# Patient Record
Sex: MALE | Race: WHITE | NOT HISPANIC OR LATINO | Employment: FULL TIME | URBAN - METROPOLITAN AREA
[De-identification: names, ages, dates, MRNs, and addresses within clinical notes are randomized per-mention and may not be internally consistent; named-entity substitution may affect disease eponyms.]

---

## 2018-01-04 ENCOUNTER — PATIENT MESSAGE (OUTPATIENT)
Dept: INTERNAL MEDICINE | Facility: CLINIC | Age: 38
End: 2018-01-04

## 2018-01-04 DIAGNOSIS — Z00.00 ENCOUNTER FOR ANNUAL PHYSICAL EXAM: Primary | ICD-10-CM

## 2018-01-17 ENCOUNTER — LAB VISIT (OUTPATIENT)
Dept: LAB | Facility: HOSPITAL | Age: 38
End: 2018-01-17
Attending: INTERNAL MEDICINE
Payer: COMMERCIAL

## 2018-01-17 DIAGNOSIS — Z00.00 ENCOUNTER FOR ANNUAL PHYSICAL EXAM: ICD-10-CM

## 2018-01-17 LAB
25(OH)D3+25(OH)D2 SERPL-MCNC: 17 NG/ML
ALBUMIN SERPL BCP-MCNC: 4.1 G/DL
ALP SERPL-CCNC: 97 U/L
ALT SERPL W/O P-5'-P-CCNC: 23 U/L
ANION GAP SERPL CALC-SCNC: 8 MMOL/L
AST SERPL-CCNC: 24 U/L
BASOPHILS # BLD AUTO: 0.06 K/UL
BASOPHILS NFR BLD: 0.6 %
BILIRUB SERPL-MCNC: 0.5 MG/DL
BUN SERPL-MCNC: 15 MG/DL
CALCIUM SERPL-MCNC: 9.6 MG/DL
CHLORIDE SERPL-SCNC: 103 MMOL/L
CHOLEST SERPL-MCNC: 227 MG/DL
CHOLEST/HDLC SERPL: 4.6 {RATIO}
CO2 SERPL-SCNC: 27 MMOL/L
CREAT SERPL-MCNC: 1 MG/DL
DIFFERENTIAL METHOD: ABNORMAL
EOSINOPHIL # BLD AUTO: 0.4 K/UL
EOSINOPHIL NFR BLD: 3.4 %
ERYTHROCYTE [DISTWIDTH] IN BLOOD BY AUTOMATED COUNT: 13.2 %
EST. GFR  (AFRICAN AMERICAN): >60 ML/MIN/1.73 M^2
EST. GFR  (NON AFRICAN AMERICAN): >60 ML/MIN/1.73 M^2
GLUCOSE SERPL-MCNC: 84 MG/DL
HCT VFR BLD AUTO: 44.1 %
HDLC SERPL-MCNC: 49 MG/DL
HDLC SERPL: 21.6 %
HGB BLD-MCNC: 15.1 G/DL
LDLC SERPL CALC-MCNC: 152.2 MG/DL
LYMPHOCYTES # BLD AUTO: 3.2 K/UL
LYMPHOCYTES NFR BLD: 30.6 %
MCH RBC QN AUTO: 28.9 PG
MCHC RBC AUTO-ENTMCNC: 34.2 G/DL
MCV RBC AUTO: 85 FL
MONOCYTES # BLD AUTO: 0.9 K/UL
MONOCYTES NFR BLD: 8.2 %
NEUTROPHILS # BLD AUTO: 6 K/UL
NEUTROPHILS NFR BLD: 57.2 %
NONHDLC SERPL-MCNC: 178 MG/DL
PLATELET # BLD AUTO: 353 K/UL
PMV BLD AUTO: 9.7 FL
POTASSIUM SERPL-SCNC: 4.1 MMOL/L
PROT SERPL-MCNC: 7.8 G/DL
RBC # BLD AUTO: 5.22 M/UL
SODIUM SERPL-SCNC: 138 MMOL/L
TRIGL SERPL-MCNC: 129 MG/DL
WBC # BLD AUTO: 10.55 K/UL

## 2018-01-17 PROCEDURE — 85025 COMPLETE CBC W/AUTO DIFF WBC: CPT

## 2018-01-17 PROCEDURE — 80061 LIPID PANEL: CPT

## 2018-01-17 PROCEDURE — 36415 COLL VENOUS BLD VENIPUNCTURE: CPT

## 2018-01-17 PROCEDURE — 82306 VITAMIN D 25 HYDROXY: CPT

## 2018-01-17 PROCEDURE — 80053 COMPREHEN METABOLIC PANEL: CPT

## 2018-01-19 ENCOUNTER — OFFICE VISIT (OUTPATIENT)
Dept: INTERNAL MEDICINE | Facility: CLINIC | Age: 38
End: 2018-01-19
Payer: COMMERCIAL

## 2018-01-19 ENCOUNTER — IMMUNIZATION (OUTPATIENT)
Dept: INTERNAL MEDICINE | Facility: CLINIC | Age: 38
End: 2018-01-19
Payer: COMMERCIAL

## 2018-01-19 VITALS
BODY MASS INDEX: 33.95 KG/M2 | DIASTOLIC BLOOD PRESSURE: 75 MMHG | WEIGHT: 242.5 LBS | HEIGHT: 71 IN | SYSTOLIC BLOOD PRESSURE: 125 MMHG

## 2018-01-19 DIAGNOSIS — E78.2 MIXED HYPERLIPIDEMIA: ICD-10-CM

## 2018-01-19 DIAGNOSIS — J01.00 SUBACUTE MAXILLARY SINUSITIS: ICD-10-CM

## 2018-01-19 DIAGNOSIS — E55.9 MILD VITAMIN D DEFICIENCY: ICD-10-CM

## 2018-01-19 DIAGNOSIS — E66.09 NON MORBID OBESITY DUE TO EXCESS CALORIES: ICD-10-CM

## 2018-01-19 DIAGNOSIS — Z00.00 ANNUAL PHYSICAL EXAM: Primary | ICD-10-CM

## 2018-01-19 PROCEDURE — 99999 PR PBB SHADOW E&M-EST. PATIENT-LVL III: CPT | Mod: PBBFAC,,, | Performed by: INTERNAL MEDICINE

## 2018-01-19 PROCEDURE — 99395 PREV VISIT EST AGE 18-39: CPT | Mod: 25,S$GLB,, | Performed by: INTERNAL MEDICINE

## 2018-01-19 PROCEDURE — 90471 IMMUNIZATION ADMIN: CPT | Mod: S$GLB,,, | Performed by: INTERNAL MEDICINE

## 2018-01-19 PROCEDURE — 90686 IIV4 VACC NO PRSV 0.5 ML IM: CPT | Mod: S$GLB,,, | Performed by: INTERNAL MEDICINE

## 2018-01-19 RX ORDER — AMOXICILLIN AND CLAVULANATE POTASSIUM 875; 125 MG/1; MG/1
1 TABLET, FILM COATED ORAL 2 TIMES DAILY
Qty: 14 TABLET | Refills: 0 | Status: SHIPPED | OUTPATIENT
Start: 2018-01-19 | End: 2018-01-26

## 2018-01-19 RX ORDER — VIT C/E/ZN/COPPR/LUTEIN/ZEAXAN 250MG-90MG
2000 CAPSULE ORAL DAILY
Qty: 60 CAPSULE | Refills: 12 | Status: SHIPPED | OUTPATIENT
Start: 2018-01-19 | End: 2019-01-14

## 2018-01-19 RX ORDER — ERGOCALCIFEROL 1.25 MG/1
50000 CAPSULE ORAL
Qty: 12 CAPSULE | Refills: 12 | Status: SHIPPED | OUTPATIENT
Start: 2018-01-19 | End: 2018-01-19 | Stop reason: SDUPTHER

## 2018-01-19 RX ORDER — ERGOCALCIFEROL 1.25 MG/1
50000 CAPSULE ORAL
Qty: 12 CAPSULE | Refills: 12 | Status: SHIPPED | OUTPATIENT
Start: 2018-01-19 | End: 2019-01-14

## 2018-01-19 NOTE — PATIENT INSTRUCTIONS
Prevention Guidelines, Men Ages 40 to 49  Screening tests and vaccines are an important part of managing your health. Health counseling is essential, too. Below are guidelines for these, for men ages 40 to 49. Talk with your healthcare provider to make sure youre up to date on what you need.  Screening Who needs it How often   Alcohol misuse All men in this age group At routine exams   Blood pressure All men in this age group Every 2 years if your blood pressure reading is less than 120/80 mm Hg; yearly if your systolic blood pressure reading is 120 to 139 mm Hg, or your diastolic blood pressure reading is 80 to 89 mm Hg   Depression All men in this age group At routine exams   Type 2 diabetes or prediabetes All adults beginning at age 45 and adults without symptoms at any age who are overweight or obese and have 1 or more other risk factors for diabetes At least every 3 years (yearly if blood sugar has begun to rise)   Hepatitis C Men at increased risk for infection - talk with your healthcare provider At routine exams   High cholesterol or triglycerides All men ages 35 and older, and younger men at high risk for coronary artery disease At least every 5 years   HIV All men At routine exams   Obesity All men in this age group At routine exams   Prostate cancer Starting at age 45, talk to healthcare provider about risks and benefits of digital rectal exam (RANDELL) and prostate-specific antigen (PSA) screening1 At routine exams   Syphilis Men at increased risk for infection - talk with your healthcare provider At routine exams   Tuberculosis Men at increased risk for infection - talk with your healthcare provider Check with your healthcare provider   Vision All men in this age group Every 2 to 4 years if no risk factors for eye disease2   Vaccine Who needs it How often   Chickenpox (varicella) All men in this age group who have no record of this infection or vaccine 2 doses; the second dose should be given at least 4  weeks after the first dose   Hepatitis A Men at increased risk for infection - talk with your healthcare provider 2 doses given at least 6 months apart   Hepatitis B Men at increased risk for infection - talk with your healthcare provider 3 doses over 6 months; second dose should be given 1 month after the first dose; the third dose should be given at least 2 months after the second dose and at least 4 months after the first dose   Haemophilus influenzae Type B (HIB) Men at increased risk for infection - talk with your healthcare provider 1 to 3 doses   Influenza (flu) All men in this age group Once a year   Measles, mumps, rubella (MMR) All men in this age group who have no record of these infections or vaccines 1 or 2 doses   Meningococcal Men at increased risk for infection - talk with your healthcare provider 1 or more doses   Pneumococcal conjugate vaccine (PCV13) and pneumococcal polysaccharide vaccine (PPSV23) Men at increased risk for infection - talk with your healthcare provider PCV13: 1 dose ages 19 to 65 (protects against 13 types of pneumococcal bacteria)     PPSV23: 1 to 2 doses through age 64, or 1 dose at 65 or older (protects against 23 types of pneumococcal bacteria)      Tetanus/diphtheria/  pertussis (Td/Tdap) booster All men in this age group Td every 10 years, or a one-time dose of Tdap instead of a Td booster after age 18, then Td every 10 years   Counseling Who needs it How often   Diet and exercise Men who are overweight or obese When diagnosed, and then at routine exams   Sexually transmitted infection prevention Men at increased risk for infection - talk with your healthcare provider At routine exams   Use of daily aspirin Men ages 45 to 79 at risk for cardiovascular health problems At routine exams   Use of tobacco and the health effects it can cause All men in this age group Every exam   [address] [place] [address] [facility]   [facility]  Date Last Reviewed:  2/1/2017 © 2000-2017 [employer]. [address] [place] [address], [place] [address]. All rights reserved. This information is not intended as a substitute for professional medical care. Always follow your healthcare professional's instructions.        Sinus Headache    The sinuses are air-filled spaces within the bones of the face. They connect to the inside of the nose. Sinusitis is an inflammation of the tissue lining the sinus cavity. Sinus inflammation can occur during a cold or hay fever (allergies to pollens and other particles in the air) and cause symptoms of sinus congestion and fullness and perhaps a low-grade fever. An infection is usually present when there is also facial pain or headache and green or yellow drainage from the nose or into the back of the throat (postnasal drip). Antibiotics are often prescribed to treat this condition.  Sinus headache may cause pain in different places, depending on which sinuses are infected. There may be pain in the temples, forehead, top of the head, behind or around the eye, across the cheekbone, or into the upper teeth.  You may find that changing your position, sitting upright or lying down, will bring some relief.  Home care  The following guidelines will help you care for yourself at home:  · Drink plenty of water, hot tea, and other liquids to stay well hydrated. This thins the mucus and helps your sinuses drain.  · Apply heat to the painful areas of the face. Use a towel soaked in hot water. Or  the shower with the hot spray on your face. This is a good way to inhale warm water vapor and get heat on your face at the same time. Cover your mouth and nose with your hands so you can still breathe as you do this.  · Use a cool mist vaporizer at night. Suck on peppermint, menthol, or eucalyptus hard candies during the day.  · An expectorant containing guaifenesin helps thin the mucus. It also helps your sinuses drain.  · You may use over-the-counter  decongestants unless a similar medicine was prescribed. Nasal sprays or drops work the fastest. Use one that contains phenylephrine or oxymetazoline. First blow your nose gently to remove mucus. Then apply the spray or drops. Don't use decongestant nasal sprays or drops more often than the label says or for more than 3 days. This can make symptoms worse. Nasal sprays or drops prescribed by your doctor typically do not have these limits. Check with your doctor or pharmacist. You may also use oral tablets containing pseudoephedrine. Side effects from oral decongestants tend to be worse than with nasal sprays or drops, and may keep you from using them. Many sinus remedies combine ingredients, which may increase side effects. Also, if you are taking a combination medicine with another medicine, be sure you are not taking a double dose of anything by mistake. Read the labels or ask the pharmacist for help. Talk with your doctor before using decongestants if you have high blood pressure, heart disease, glaucoma, or prostate trouble.  · Antihistamines may help if allergies are causing your sinusitis. You can get chlorpheniramine and diphenhydramine over the counter, but these can cause drowsiness. Don't use these if you have glaucoma or if you are a man with trouble urinating due to an enlarged prostate. Over-the-counter antihistamines containing loratidine and cetirizine cause less drowsiness and may be a better choice for daytime use.  · When allergies cause your sinusitis, a saline nasal rinse may give relief. A saline nasal rinse reduces swelling and clears excess mucus. This allows sinuses to drain. Prepackaged kits are available at most drugstores. These contain premixed salt packets and an irrigation device. If antibiotics have been prescribed to treat an acute sinus infection, talk with your doctor before using a nasal rinse to be sure it is safe for you.  · You may use over-the-counter medicine to control pain and  fever, unless another pain medicine was prescribed. Talk with your doctor before using acetaminophen or ibuprofen if you have chronic liver or kidney disease. Also talk with your doctor if you have ever had a stomach ulcer. Aspirin should never be used in anyone under 18 years of age who has a fever. It may cause a life-threatening condition called Reye syndrome.  · If antibiotics were given, finish all of them, even if you are feeling better after a few days.  Follow-up care  Follow up with your healthcare provider, or as advised if your symptoms aren't better in 1 week.  Call 911  Call 911 if any of these occur:  · Unusual drowsiness or confusion  · Swelling of the forehead or eyelids  · Vision problems including blurred or double vision  · Seizure  When to seek medical advice  Call your healthcare provider right away if any of these occur:  · Facial pain or headache becomes more severe  · Stiff neck  · Fever over 100.4º F (38.0º C) for more than 3 days on antibiotics  · Bleeding from the nose or throat  Date Last Reviewed: 10/1/2016  © 2693-4989 [employer]. [address] [place] [address], [place] [address]. All rights reserved. This information is not intended as a substitute for professional medical care. Always follow your healthcare professional's instructions.

## 2019-01-07 ENCOUNTER — PATIENT MESSAGE (OUTPATIENT)
Dept: INTERNAL MEDICINE | Facility: CLINIC | Age: 39
End: 2019-01-07

## 2019-01-07 DIAGNOSIS — E78.2 MIXED HYPERLIPIDEMIA: Primary | ICD-10-CM

## 2019-01-07 DIAGNOSIS — E66.09 NON MORBID OBESITY DUE TO EXCESS CALORIES: ICD-10-CM

## 2019-01-07 DIAGNOSIS — E55.9 MILD VITAMIN D DEFICIENCY: ICD-10-CM

## 2019-01-10 ENCOUNTER — LAB VISIT (OUTPATIENT)
Dept: LAB | Facility: HOSPITAL | Age: 39
End: 2019-01-10
Attending: INTERNAL MEDICINE
Payer: COMMERCIAL

## 2019-01-10 DIAGNOSIS — E66.09 NON MORBID OBESITY DUE TO EXCESS CALORIES: ICD-10-CM

## 2019-01-10 DIAGNOSIS — E78.2 MIXED HYPERLIPIDEMIA: ICD-10-CM

## 2019-01-10 LAB
ALBUMIN SERPL BCP-MCNC: 4.1 G/DL
ALP SERPL-CCNC: 90 U/L
ALT SERPL W/O P-5'-P-CCNC: 28 U/L
ANION GAP SERPL CALC-SCNC: 9 MMOL/L
AST SERPL-CCNC: 29 U/L
BASOPHILS # BLD AUTO: 0.05 K/UL
BASOPHILS NFR BLD: 0.5 %
BILIRUB SERPL-MCNC: 0.4 MG/DL
BUN SERPL-MCNC: 18 MG/DL
CALCIUM SERPL-MCNC: 9.6 MG/DL
CHLORIDE SERPL-SCNC: 105 MMOL/L
CHOLEST SERPL-MCNC: 255 MG/DL
CHOLEST/HDLC SERPL: 4.8 {RATIO}
CO2 SERPL-SCNC: 26 MMOL/L
CREAT SERPL-MCNC: 1 MG/DL
DIFFERENTIAL METHOD: NORMAL
EOSINOPHIL # BLD AUTO: 0.2 K/UL
EOSINOPHIL NFR BLD: 1.9 %
ERYTHROCYTE [DISTWIDTH] IN BLOOD BY AUTOMATED COUNT: 13.6 %
EST. GFR  (AFRICAN AMERICAN): >60 ML/MIN/1.73 M^2
EST. GFR  (NON AFRICAN AMERICAN): >60 ML/MIN/1.73 M^2
GLUCOSE SERPL-MCNC: 82 MG/DL
HCT VFR BLD AUTO: 42.2 %
HDLC SERPL-MCNC: 53 MG/DL
HDLC SERPL: 20.8 %
HGB BLD-MCNC: 14.1 G/DL
LDLC SERPL CALC-MCNC: 176.8 MG/DL
LYMPHOCYTES # BLD AUTO: 3.1 K/UL
LYMPHOCYTES NFR BLD: 29.4 %
MCH RBC QN AUTO: 28.7 PG
MCHC RBC AUTO-ENTMCNC: 33.4 G/DL
MCV RBC AUTO: 86 FL
MONOCYTES # BLD AUTO: 1 K/UL
MONOCYTES NFR BLD: 9.6 %
NEUTROPHILS # BLD AUTO: 6.3 K/UL
NEUTROPHILS NFR BLD: 58.6 %
NONHDLC SERPL-MCNC: 202 MG/DL
PLATELET # BLD AUTO: 321 K/UL
PMV BLD AUTO: 9.3 FL
POTASSIUM SERPL-SCNC: 4.1 MMOL/L
PROT SERPL-MCNC: 7.9 G/DL
RBC # BLD AUTO: 4.92 M/UL
SODIUM SERPL-SCNC: 140 MMOL/L
TRIGL SERPL-MCNC: 126 MG/DL
WBC # BLD AUTO: 10.65 K/UL

## 2019-01-10 PROCEDURE — 36415 COLL VENOUS BLD VENIPUNCTURE: CPT

## 2019-01-10 PROCEDURE — 80053 COMPREHEN METABOLIC PANEL: CPT

## 2019-01-10 PROCEDURE — 85025 COMPLETE CBC W/AUTO DIFF WBC: CPT

## 2019-01-10 PROCEDURE — 80061 LIPID PANEL: CPT

## 2019-01-10 PROCEDURE — 82306 VITAMIN D 25 HYDROXY: CPT

## 2019-01-11 LAB — 25(OH)D3+25(OH)D2 SERPL-MCNC: 28 NG/ML

## 2019-01-14 ENCOUNTER — OFFICE VISIT (OUTPATIENT)
Dept: INTERNAL MEDICINE | Facility: CLINIC | Age: 39
End: 2019-01-14
Payer: COMMERCIAL

## 2019-01-14 ENCOUNTER — IMMUNIZATION (OUTPATIENT)
Dept: INTERNAL MEDICINE | Facility: CLINIC | Age: 39
End: 2019-01-14
Payer: COMMERCIAL

## 2019-01-14 VITALS
BODY MASS INDEX: 35.18 KG/M2 | HEIGHT: 71 IN | SYSTOLIC BLOOD PRESSURE: 125 MMHG | WEIGHT: 251.31 LBS | DIASTOLIC BLOOD PRESSURE: 80 MMHG

## 2019-01-14 DIAGNOSIS — Z00.00 ANNUAL PHYSICAL EXAM: Primary | ICD-10-CM

## 2019-01-14 DIAGNOSIS — E78.2 MIXED HYPERLIPIDEMIA: ICD-10-CM

## 2019-01-14 DIAGNOSIS — E55.9 MILD VITAMIN D DEFICIENCY: ICD-10-CM

## 2019-01-14 DIAGNOSIS — E66.01 SEVERE OBESITY (BMI 35.0-35.9 WITH COMORBIDITY): ICD-10-CM

## 2019-01-14 PROCEDURE — 99999 PR PBB SHADOW E&M-EST. PATIENT-LVL III: ICD-10-PCS | Mod: PBBFAC,,, | Performed by: INTERNAL MEDICINE

## 2019-01-14 PROCEDURE — 99395 PREV VISIT EST AGE 18-39: CPT | Mod: 25,S$GLB,, | Performed by: INTERNAL MEDICINE

## 2019-01-14 PROCEDURE — 99395 PR PREVENTIVE VISIT,EST,18-39: ICD-10-PCS | Mod: 25,S$GLB,, | Performed by: INTERNAL MEDICINE

## 2019-01-14 PROCEDURE — 99999 PR PBB SHADOW E&M-EST. PATIENT-LVL III: CPT | Mod: PBBFAC,,, | Performed by: INTERNAL MEDICINE

## 2019-01-14 PROCEDURE — 90686 IIV4 VACC NO PRSV 0.5 ML IM: CPT | Mod: S$GLB,,, | Performed by: INTERNAL MEDICINE

## 2019-01-14 PROCEDURE — 90686 FLU VACCINE (QUAD) GREATER THAN OR EQUAL TO 3YO PRESERVATIVE FREE IM: ICD-10-PCS | Mod: S$GLB,,, | Performed by: INTERNAL MEDICINE

## 2019-01-14 PROCEDURE — 90471 IMMUNIZATION ADMIN: CPT | Mod: S$GLB,,, | Performed by: INTERNAL MEDICINE

## 2019-01-14 PROCEDURE — 90471 FLU VACCINE (QUAD) GREATER THAN OR EQUAL TO 3YO PRESERVATIVE FREE IM: ICD-10-PCS | Mod: S$GLB,,, | Performed by: INTERNAL MEDICINE

## 2019-01-14 RX ORDER — VIT C/E/ZN/COPPR/LUTEIN/ZEAXAN 250MG-90MG
1000 CAPSULE ORAL DAILY
Qty: 30 CAPSULE | Refills: 12 | Status: SHIPPED | OUTPATIENT
Start: 2019-01-14

## 2019-01-14 NOTE — PROGRESS NOTES
Subjective:       Patient ID: Junior Silva is a 38 y.o. male.    Chief Complaint: Annual Exam    Annual exam.    , about to have a baby.    BMI 35 +.    Lipids again discussed.    Still not exercising.  Denies ALEXANDER symptoms.  Denies depression.  Long discussion, essentially identical to previous discussions over the course of the last many years regarding diet, lifestyle, exercise, sleep hygiene and medication recommendations.    Patient Active Problem List:     Mixed hyperlipidemia     Mild vitamin D deficiency     Gastroesophageal reflux disease without esophagitis     Hemorrhoids     Severe obesity with comorbidity        Review of Systems   Constitutional: Negative.    HENT: Negative for congestion, postnasal drip, rhinorrhea and sinus pressure.    Eyes: Negative for redness and visual disturbance.   Respiratory: Negative for apnea, choking, shortness of breath and stridor.    Cardiovascular: Negative for chest pain, palpitations and leg swelling.   Gastrointestinal: Negative for abdominal pain, constipation, diarrhea and nausea.   Genitourinary: Negative for difficulty urinating, flank pain, frequency, testicular pain and urgency.   Musculoskeletal: Negative for arthralgias, back pain and myalgias.   Skin: Negative for color change and rash.   Neurological: Negative.    Psychiatric/Behavioral: Negative.        Objective:      Physical Exam   Constitutional: He is oriented to person, place, and time. He appears well-developed and well-nourished.   HENT:   Head: Normocephalic and atraumatic.   Right Ear: External ear normal.   Left Ear: External ear normal.   Eyes: Conjunctivae and EOM are normal. Pupils are equal, round, and reactive to light.   Neck: Normal range of motion. Neck supple. No thyromegaly present.   Cardiovascular: Normal rate and regular rhythm.   No murmur heard.  Pulmonary/Chest: Effort normal and breath sounds normal. No respiratory distress. He has no wheezes.   Abdominal: Soft. He  exhibits no distension. There is no tenderness.   Musculoskeletal: He exhibits no edema or tenderness.   Lymphadenopathy:     He has no cervical adenopathy.   Neurological: He is alert and oriented to person, place, and time. No cranial nerve deficit.   Skin: Skin is warm and dry.   Psychiatric: He has a normal mood and affect. His behavior is normal.       Assessment:       1. Annual physical exam    2. Mixed hyperlipidemia    3. Mild vitamin D deficiency    4. Severe obesity (BMI 35.0-35.9 with comorbidity)        Plan:         Junior SURESH was seen today for annual exam.    Diagnoses and all orders for this visit:    Annual physical exam    Mixed hyperlipidemia:  Exercise, lifestyle modification, portion control, diet issues reviewed.  Goal of 20-25 lb weight loss in the next year.  He declines dietitian appointment or bariatric assessment.    Mild vitamin D deficiency  -     cholecalciferol, vitamin D3, (VITAMIN D3) 1,000 unit capsule; Take 1 capsule (1,000 Units total) by mouth once daily.    Severe obesity (BMI 35.0-35.9 with comorbidity):  See above.  Denies any symptoms of ALEXANDER.  He feels he can do this on his own.  Long discussion.    Anticipate annual follow-up but return sooner with problems in the interim  Flu shot today

## 2019-01-14 NOTE — PATIENT INSTRUCTIONS
Prevention Guidelines, Men Ages 18 to 39  Screening tests and vaccines are an important part of managing your health. Health counseling is essential, too. Below are guidelines for these, for men ages 18 to 39. Talk with your healthcare provider to make sure youre up-to-date on what you need.  Screening Who needs it How often   Alcohol misuse All men in this age group At routine exams   Blood pressure All men in this age group Every 2 years if your blood pressure is less than 120/80 mm Hg; yearly if your systolic blood pressure is 120 to 139 mm Hg, or your diastolic blood pressure reading is 80 to 89 mm Hg   Depression All men in this age group At routine exams   Diabetes mellitus, type 2 Adults who have no symptoms but are overweight or obese and have 1 or more other risk factors for diabetes At least every 3 years (yearly if blood sugar has already started to rise)   Hepatitis C If at increased risk At routine exams   High cholesterol or triglycerides All men ages 35 and older, and younger men at high risk for coronary artery disease At least every 5 years   HIV All men At routine exams   Obesity All men in this age group At routine exams   Syphilis Men at increased risk for infection - talk with your healthcare provider At routine exams   Tuberculosis Men at increased risk for infection - talk with your healthcare provider Check with your healthcare provider   Vision All men in this age group Every 5 to 10 years if no risk factors for eye disease   Vaccines Who needs it How often   Chickenpox (varicella) All men in this age group who have no record of this infection or vaccine 2 doses; the second dose should be given at least 4 weeks after the first dose   Hepatitis A Men at increased risk for infection - talk with your healthcare provider 2 doses given at least 6 months apart   Hepatitis B Men at increased risk for infection - talk with your healthcare provider 3 doses over 6 months; second dose should be  given 1 month after the first dose; the third dose should be given at least 2 months after the second dose and at least 4 months after the first dose   Haemophilus influenzae Type B (HIB) Men at increased risk for infection - talk with your healthcare provider 1 to 3 doses   Human papillomavirus (HPV) All men in this age group up to age 26 3 doses; the second dose should be given 1 to 2 months after the first dose and the third dose given 6 months after the first dose   Influenza (flu) All men in this age group Once a year   Measles, mumps, rubella (MMR) All men in this age group who have no record of these infections or vaccines 1 or 2 doses through age 55   Meningococcal Men at increased risk for infection - talk with your healthcare provider 1 or more doses   Pneumococca (PCV13) and Pneumococcal (PPSV23) Men at increased risk for infection - talk with your healthcare provider PCV13: 1 dose ages 19 to 65 (protects against 13 types of pneumococcal bacteria)  PPSV23: 1 to 2 doses through age 64, or 1 dose at 65 or older (protects against 23 types of pneumococcal bacteria)   Tetanus/diphtheria/pertussis (Td/Tdap) booster All men in this age group A one-time Tdap booster after age 18, then Td every10 years   Counseling Who needs it How often   Diet and exercise Overweight or obese people When diagnosed, and then at routine exams   Use of tobacco and the health effects it can cause All men in this age group Every visit   Sexually transmitted infection prevention Men who are sexually active At routine exams   Skin cancer Prevention of skin cancer in fair-skinned adults through age 24 At routine exams   1Those who are 18 years of age, who are not up-to-date on their childhood immunizations, should receive all appropriate catch-up vaccines recommended by the CDC.  Date Last Reviewed: 2/1/2017  © 0267-4673 [employer]. [address] [place] [address], [place] [address]. All rights reserved. This information is not  intended as a substitute for professional medical care. Always follow your healthcare professional's instructions.

## 2020-10-05 ENCOUNTER — PATIENT MESSAGE (OUTPATIENT)
Dept: ADMINISTRATIVE | Facility: HOSPITAL | Age: 40
End: 2020-10-05

## 2021-01-04 ENCOUNTER — PATIENT MESSAGE (OUTPATIENT)
Dept: ADMINISTRATIVE | Facility: HOSPITAL | Age: 41
End: 2021-01-04

## 2021-04-05 ENCOUNTER — PATIENT MESSAGE (OUTPATIENT)
Dept: ADMINISTRATIVE | Facility: HOSPITAL | Age: 41
End: 2021-04-05

## 2021-08-16 ENCOUNTER — OFFICE VISIT (OUTPATIENT)
Dept: INTERNAL MEDICINE | Facility: CLINIC | Age: 41
End: 2021-08-16
Payer: COMMERCIAL

## 2021-08-16 VITALS
WEIGHT: 269 LBS | DIASTOLIC BLOOD PRESSURE: 80 MMHG | BODY MASS INDEX: 37.66 KG/M2 | HEIGHT: 71 IN | SYSTOLIC BLOOD PRESSURE: 135 MMHG

## 2021-08-16 DIAGNOSIS — B96.89 ACUTE BACTERIAL SINUSITIS: ICD-10-CM

## 2021-08-16 DIAGNOSIS — E78.2 MIXED HYPERLIPIDEMIA: ICD-10-CM

## 2021-08-16 DIAGNOSIS — E55.9 MILD VITAMIN D DEFICIENCY: ICD-10-CM

## 2021-08-16 DIAGNOSIS — Z00.00 ANNUAL PHYSICAL EXAM: Primary | ICD-10-CM

## 2021-08-16 DIAGNOSIS — K64.9 HEMORRHOIDS, UNSPECIFIED HEMORRHOID TYPE: ICD-10-CM

## 2021-08-16 DIAGNOSIS — J01.90 ACUTE BACTERIAL SINUSITIS: ICD-10-CM

## 2021-08-16 DIAGNOSIS — E66.01 SEVERE OBESITY (BMI 35.0-35.9 WITH COMORBIDITY): ICD-10-CM

## 2021-08-16 DIAGNOSIS — K92.1 BLOOD IN STOOL: ICD-10-CM

## 2021-08-16 PROCEDURE — 99999 PR PBB SHADOW E&M-EST. PATIENT-LVL III: ICD-10-PCS | Mod: PBBFAC,,, | Performed by: INTERNAL MEDICINE

## 2021-08-16 PROCEDURE — 3079F PR MOST RECENT DIASTOLIC BLOOD PRESSURE 80-89 MM HG: ICD-10-PCS | Mod: CPTII,S$GLB,, | Performed by: INTERNAL MEDICINE

## 2021-08-16 PROCEDURE — 1160F RVW MEDS BY RX/DR IN RCRD: CPT | Mod: CPTII,S$GLB,, | Performed by: INTERNAL MEDICINE

## 2021-08-16 PROCEDURE — 1159F PR MEDICATION LIST DOCUMENTED IN MEDICAL RECORD: ICD-10-PCS | Mod: CPTII,S$GLB,, | Performed by: INTERNAL MEDICINE

## 2021-08-16 PROCEDURE — 3075F SYST BP GE 130 - 139MM HG: CPT | Mod: CPTII,S$GLB,, | Performed by: INTERNAL MEDICINE

## 2021-08-16 PROCEDURE — 1126F PR PAIN SEVERITY QUANTIFIED, NO PAIN PRESENT: ICD-10-PCS | Mod: CPTII,S$GLB,, | Performed by: INTERNAL MEDICINE

## 2021-08-16 PROCEDURE — 99999 PR PBB SHADOW E&M-EST. PATIENT-LVL III: CPT | Mod: PBBFAC,,, | Performed by: INTERNAL MEDICINE

## 2021-08-16 PROCEDURE — 1160F PR REVIEW ALL MEDS BY PRESCRIBER/CLIN PHARMACIST DOCUMENTED: ICD-10-PCS | Mod: CPTII,S$GLB,, | Performed by: INTERNAL MEDICINE

## 2021-08-16 PROCEDURE — 3079F DIAST BP 80-89 MM HG: CPT | Mod: CPTII,S$GLB,, | Performed by: INTERNAL MEDICINE

## 2021-08-16 PROCEDURE — 1126F AMNT PAIN NOTED NONE PRSNT: CPT | Mod: CPTII,S$GLB,, | Performed by: INTERNAL MEDICINE

## 2021-08-16 PROCEDURE — 3075F PR MOST RECENT SYSTOLIC BLOOD PRESS GE 130-139MM HG: ICD-10-PCS | Mod: CPTII,S$GLB,, | Performed by: INTERNAL MEDICINE

## 2021-08-16 PROCEDURE — 99395 PR PREVENTIVE VISIT,EST,18-39: ICD-10-PCS | Mod: S$GLB,,, | Performed by: INTERNAL MEDICINE

## 2021-08-16 PROCEDURE — 3008F PR BODY MASS INDEX (BMI) DOCUMENTED: ICD-10-PCS | Mod: CPTII,S$GLB,, | Performed by: INTERNAL MEDICINE

## 2021-08-16 PROCEDURE — 99395 PREV VISIT EST AGE 18-39: CPT | Mod: S$GLB,,, | Performed by: INTERNAL MEDICINE

## 2021-08-16 PROCEDURE — 1159F MED LIST DOCD IN RCRD: CPT | Mod: CPTII,S$GLB,, | Performed by: INTERNAL MEDICINE

## 2021-08-16 PROCEDURE — 3008F BODY MASS INDEX DOCD: CPT | Mod: CPTII,S$GLB,, | Performed by: INTERNAL MEDICINE

## 2021-08-16 RX ORDER — AMOXICILLIN AND CLAVULANATE POTASSIUM 875; 125 MG/1; MG/1
1 TABLET, FILM COATED ORAL 2 TIMES DAILY
Qty: 20 TABLET | Refills: 0 | Status: SHIPPED | OUTPATIENT
Start: 2021-08-16 | End: 2021-12-14

## 2021-08-17 PROBLEM — B96.89 ACUTE BACTERIAL SINUSITIS: Status: ACTIVE | Noted: 2021-08-17

## 2021-08-17 PROBLEM — J01.90 ACUTE BACTERIAL SINUSITIS: Status: ACTIVE | Noted: 2021-08-17

## 2021-08-18 ENCOUNTER — LAB VISIT (OUTPATIENT)
Dept: LAB | Facility: HOSPITAL | Age: 41
End: 2021-08-18
Attending: INTERNAL MEDICINE
Payer: COMMERCIAL

## 2021-08-18 DIAGNOSIS — Z00.00 ANNUAL PHYSICAL EXAM: ICD-10-CM

## 2021-08-18 LAB
ALBUMIN SERPL BCP-MCNC: 3.8 G/DL (ref 3.5–5.2)
ALP SERPL-CCNC: 100 U/L (ref 55–135)
ALT SERPL W/O P-5'-P-CCNC: 34 U/L (ref 10–44)
ANION GAP SERPL CALC-SCNC: 14 MMOL/L (ref 8–16)
AST SERPL-CCNC: 30 U/L (ref 10–40)
BASOPHILS # BLD AUTO: 0.07 K/UL (ref 0–0.2)
BASOPHILS NFR BLD: 0.7 % (ref 0–1.9)
BILIRUB SERPL-MCNC: 0.4 MG/DL (ref 0.1–1)
BUN SERPL-MCNC: 16 MG/DL (ref 6–20)
CALCIUM SERPL-MCNC: 9.6 MG/DL (ref 8.7–10.5)
CHLORIDE SERPL-SCNC: 103 MMOL/L (ref 95–110)
CHOLEST SERPL-MCNC: 229 MG/DL (ref 120–199)
CHOLEST/HDLC SERPL: 5.9 {RATIO} (ref 2–5)
CO2 SERPL-SCNC: 22 MMOL/L (ref 23–29)
CREAT SERPL-MCNC: 0.9 MG/DL (ref 0.5–1.4)
DIFFERENTIAL METHOD: ABNORMAL
EOSINOPHIL # BLD AUTO: 0.3 K/UL (ref 0–0.5)
EOSINOPHIL NFR BLD: 3 % (ref 0–8)
ERYTHROCYTE [DISTWIDTH] IN BLOOD BY AUTOMATED COUNT: 15.9 % (ref 11.5–14.5)
EST. GFR  (AFRICAN AMERICAN): >60 ML/MIN/1.73 M^2
EST. GFR  (NON AFRICAN AMERICAN): >60 ML/MIN/1.73 M^2
GLUCOSE SERPL-MCNC: 80 MG/DL (ref 70–110)
HCT VFR BLD AUTO: 41.8 % (ref 40–54)
HDLC SERPL-MCNC: 39 MG/DL (ref 40–75)
HDLC SERPL: 17 % (ref 20–50)
HGB BLD-MCNC: 13.7 G/DL (ref 14–18)
IMM GRANULOCYTES # BLD AUTO: 0.03 K/UL (ref 0–0.04)
IMM GRANULOCYTES NFR BLD AUTO: 0.3 % (ref 0–0.5)
LDLC SERPL CALC-MCNC: 156.4 MG/DL (ref 63–159)
LYMPHOCYTES # BLD AUTO: 3 K/UL (ref 1–4.8)
LYMPHOCYTES NFR BLD: 28.7 % (ref 18–48)
MCH RBC QN AUTO: 26.4 PG (ref 27–31)
MCHC RBC AUTO-ENTMCNC: 32.8 G/DL (ref 32–36)
MCV RBC AUTO: 81 FL (ref 82–98)
MONOCYTES # BLD AUTO: 1 K/UL (ref 0.3–1)
MONOCYTES NFR BLD: 9.1 % (ref 4–15)
NEUTROPHILS # BLD AUTO: 6.1 K/UL (ref 1.8–7.7)
NEUTROPHILS NFR BLD: 58.2 % (ref 38–73)
NONHDLC SERPL-MCNC: 190 MG/DL
NRBC BLD-RTO: 0 /100 WBC
PLATELET # BLD AUTO: 366 K/UL (ref 150–450)
PMV BLD AUTO: 9.5 FL (ref 9.2–12.9)
POTASSIUM SERPL-SCNC: 4.1 MMOL/L (ref 3.5–5.1)
PROT SERPL-MCNC: 7.8 G/DL (ref 6–8.4)
RBC # BLD AUTO: 5.19 M/UL (ref 4.6–6.2)
SODIUM SERPL-SCNC: 139 MMOL/L (ref 136–145)
TRIGL SERPL-MCNC: 168 MG/DL (ref 30–150)
TSH SERPL DL<=0.005 MIU/L-ACNC: 1.34 UIU/ML (ref 0.4–4)
WBC # BLD AUTO: 10.45 K/UL (ref 3.9–12.7)

## 2021-08-18 PROCEDURE — 36415 COLL VENOUS BLD VENIPUNCTURE: CPT | Performed by: INTERNAL MEDICINE

## 2021-08-18 PROCEDURE — 80061 LIPID PANEL: CPT | Performed by: INTERNAL MEDICINE

## 2021-08-18 PROCEDURE — 82306 VITAMIN D 25 HYDROXY: CPT | Performed by: INTERNAL MEDICINE

## 2021-08-18 PROCEDURE — 86803 HEPATITIS C AB TEST: CPT | Performed by: INTERNAL MEDICINE

## 2021-08-18 PROCEDURE — 83036 HEMOGLOBIN GLYCOSYLATED A1C: CPT | Performed by: INTERNAL MEDICINE

## 2021-08-18 PROCEDURE — 84443 ASSAY THYROID STIM HORMONE: CPT | Performed by: INTERNAL MEDICINE

## 2021-08-18 PROCEDURE — 85025 COMPLETE CBC W/AUTO DIFF WBC: CPT | Performed by: INTERNAL MEDICINE

## 2021-08-18 PROCEDURE — 80053 COMPREHEN METABOLIC PANEL: CPT | Performed by: INTERNAL MEDICINE

## 2021-08-18 PROCEDURE — 87389 HIV-1 AG W/HIV-1&-2 AB AG IA: CPT | Performed by: INTERNAL MEDICINE

## 2021-08-19 LAB
25(OH)D3+25(OH)D2 SERPL-MCNC: 77 NG/ML (ref 30–96)
ESTIMATED AVG GLUCOSE: 111 MG/DL (ref 68–131)
HBA1C MFR BLD: 5.5 % (ref 4–5.6)
HCV AB SERPL QL IA: NEGATIVE
HIV 1+2 AB+HIV1 P24 AG SERPL QL IA: NEGATIVE

## 2021-11-22 PROBLEM — B96.89 ACUTE BACTERIAL SINUSITIS: Status: RESOLVED | Noted: 2021-08-17 | Resolved: 2021-11-22

## 2021-11-22 PROBLEM — J01.90 ACUTE BACTERIAL SINUSITIS: Status: RESOLVED | Noted: 2021-08-17 | Resolved: 2021-11-22

## 2021-12-13 ENCOUNTER — TELEPHONE (OUTPATIENT)
Dept: SURGERY | Facility: CLINIC | Age: 41
End: 2021-12-13
Payer: COMMERCIAL

## 2021-12-14 ENCOUNTER — OFFICE VISIT (OUTPATIENT)
Dept: SURGERY | Facility: CLINIC | Age: 41
End: 2021-12-14
Payer: COMMERCIAL

## 2021-12-14 ENCOUNTER — IMMUNIZATION (OUTPATIENT)
Dept: INTERNAL MEDICINE | Facility: CLINIC | Age: 41
End: 2021-12-14
Payer: COMMERCIAL

## 2021-12-14 VITALS
WEIGHT: 273.56 LBS | HEART RATE: 74 BPM | BODY MASS INDEX: 38.3 KG/M2 | HEIGHT: 71 IN | DIASTOLIC BLOOD PRESSURE: 57 MMHG | SYSTOLIC BLOOD PRESSURE: 127 MMHG

## 2021-12-14 DIAGNOSIS — Z83.79 FAMILY HISTORY OF CROHN'S DISEASE: ICD-10-CM

## 2021-12-14 DIAGNOSIS — K62.5 RECTAL BLEEDING: Primary | ICD-10-CM

## 2021-12-14 DIAGNOSIS — Z23 NEED FOR VACCINATION: Primary | ICD-10-CM

## 2021-12-14 DIAGNOSIS — K62.5 RECTAL BLEEDING: ICD-10-CM

## 2021-12-14 PROCEDURE — 0034A COVID-19,VECTOR-NR,RS-AD26,PF,0.5 ML DOSE VACCINE (JANSSEN): CPT | Mod: CV19,PBBFAC | Performed by: INTERNAL MEDICINE

## 2021-12-14 PROCEDURE — 99204 PR OFFICE/OUTPT VISIT, NEW, LEVL IV, 45-59 MIN: ICD-10-PCS | Mod: S$GLB,,, | Performed by: NURSE PRACTITIONER

## 2021-12-14 PROCEDURE — 99999 PR PBB SHADOW E&M-EST. PATIENT-LVL III: CPT | Mod: PBBFAC,,, | Performed by: NURSE PRACTITIONER

## 2021-12-14 PROCEDURE — 99999 PR PBB SHADOW E&M-EST. PATIENT-LVL III: ICD-10-PCS | Mod: PBBFAC,,, | Performed by: NURSE PRACTITIONER

## 2021-12-14 PROCEDURE — 91303 COVID-19,VECTOR-NR,RS-AD26,PF,0.5 ML DOSE VACCINE (JANSSEN): CPT | Mod: PBBFAC | Performed by: INTERNAL MEDICINE

## 2021-12-14 PROCEDURE — 99204 OFFICE O/P NEW MOD 45 MIN: CPT | Mod: S$GLB,,, | Performed by: NURSE PRACTITIONER

## 2021-12-14 RX ORDER — HYDROCORTISONE 25 MG/G
CREAM TOPICAL 2 TIMES DAILY
Qty: 28 G | Refills: 2 | Status: SHIPPED | OUTPATIENT
Start: 2021-12-14 | End: 2021-12-14 | Stop reason: SDUPTHER

## 2021-12-14 RX ORDER — HYDROCORTISONE 25 MG/G
CREAM TOPICAL 2 TIMES DAILY
Qty: 28 G | Refills: 2 | Status: SHIPPED | OUTPATIENT
Start: 2021-12-14

## 2021-12-14 RX ORDER — HYDROCORTISONE ACETATE 25 MG/1
25 SUPPOSITORY RECTAL 2 TIMES DAILY
Qty: 20 SUPPOSITORY | Refills: 1 | Status: SHIPPED | OUTPATIENT
Start: 2021-12-14 | End: 2021-12-14

## 2021-12-15 ENCOUNTER — IMMUNIZATION (OUTPATIENT)
Dept: PHARMACY | Facility: CLINIC | Age: 41
End: 2021-12-15
Payer: COMMERCIAL

## 2022-02-09 NOTE — PROGRESS NOTES
"Subjective:       Patient ID: Junior Silva is a 37 y.o. male.    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   Traffic camera operation.  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  Trying to adhere to a healthy diet.  Lipids are not much better.  Diet again discussed.  Not much time to exercise. Starting soon, he says. Still obese with a BMI above 30, still planning to work on this.      Vitamin D is still low.    Patient Active Problem List:     Mixed hyperlipidemia     Mild vitamin D deficiency     Gastroesophageal reflux disease without esophagitis     Non morbid obesity due to excess calories     Hemorrhoids        Review of Systems   Constitutional: Negative for activity change and unexpected weight change.        Not concerned about weight- long discussion   HENT: Positive for congestion. Negative for hearing loss, rhinorrhea, sinus pain and trouble swallowing.         URI sx, slight congestion in sinuses- improved since December    No fever    Mucus is clear    Second hand smoke    Some allergy sx, uses OTC antihistatmines     Eyes: Negative for discharge and visual disturbance.   Respiratory: Negative for chest tightness and wheezing.         Denies snoring or stopping breathing   Cardiovascular: Negative for chest pain and palpitations.   Gastrointestinal: Negative for blood in stool, constipation, diarrhea and vomiting.   Endocrine: Negative for polydipsia and polyuria.   Genitourinary: Negative for difficulty urinating, hematuria and urgency.   Musculoskeletal: Negative for arthralgias, joint swelling and neck pain.   Neurological: Negative for weakness and headaches.   Psychiatric/Behavioral: Negative for confusion, decreased concentration and dysphoric mood. The patient is not nervous/anxious.         Overall sleep is good  Specifically states mood is very good       Objective:      Physical Exam   Constitutional: He is oriented " Check labs  Colonoscopy   to person, place, and time. He appears well-developed and well-nourished.   HENT:   Head: Normocephalic and atraumatic.   Right Ear: External ear normal.   Left Ear: External ear normal.   Eyes: Conjunctivae and EOM are normal.   Neck: Normal range of motion. Neck supple. No thyromegaly present.   Cardiovascular: Normal rate and regular rhythm.    No murmur heard.  Pulmonary/Chest: Effort normal and breath sounds normal. No respiratory distress. He has no wheezes.   Abdominal: Soft. He exhibits no distension. There is no tenderness.   Musculoskeletal: He exhibits no edema or tenderness.   Lymphadenopathy:     He has no cervical adenopathy.   Neurological: He is alert and oriented to person, place, and time. No cranial nerve deficit.   Skin: Skin is warm and dry.   Psychiatric: He has a normal mood and affect. His behavior is normal. Judgment and thought content normal.       Assessment:       1. Annual physical exam    2. Non morbid obesity due to excess calories    3. Mixed hyperlipidemia    4. Mild vitamin D deficiency    5. Subacute maxillary sinusitis        Plan:         Annual physical exam    Non morbid obesity due to excess calories: Exercise and lifestyle issues reviewed.  Consider health , he declines any additional assistance    Mixed hyperlipidemia: Keep working on diet, lifestyle and exercise, recheck in one year    Mild vitamin D deficiency  -     cholecalciferol, vitamin D3, 1,000 unit capsule; Take 2 capsules (2,000 Units total) by mouth once daily.  Dispense: 60 capsule; Refill: 12  -     Vitamin D; Future; Expected date: 05/19/2018  -     ergocalciferol (ERGOCALCIFEROL) 50,000 unit Cap; Take 1 capsule (50,000 Units total) by mouth every 7 days.  Dispense: 12 capsule; Refill: 12    Subacute maxillary sinusitis  -     amoxicillin-clavulanate 875-125mg (AUGMENTIN) 875-125 mg per tablet; Take 1 tablet by mouth 2 (two) times daily.  Dispense: 14 tablet; Refill: 0    Rest, fluids, acetaminophen,  mucinex and follow up poor results.

## 2023-11-29 ENCOUNTER — IMMUNIZATION (OUTPATIENT)
Dept: INTERNAL MEDICINE | Facility: CLINIC | Age: 43
End: 2023-11-29

## 2023-11-29 ENCOUNTER — LAB VISIT (OUTPATIENT)
Dept: LAB | Facility: HOSPITAL | Age: 43
End: 2023-11-29

## 2023-11-29 ENCOUNTER — OFFICE VISIT (OUTPATIENT)
Dept: INTERNAL MEDICINE | Facility: CLINIC | Age: 43
End: 2023-11-29

## 2023-11-29 VITALS
HEIGHT: 71 IN | HEART RATE: 73 BPM | SYSTOLIC BLOOD PRESSURE: 135 MMHG | OXYGEN SATURATION: 98 % | BODY MASS INDEX: 37.66 KG/M2 | DIASTOLIC BLOOD PRESSURE: 80 MMHG | WEIGHT: 269 LBS

## 2023-11-29 DIAGNOSIS — K64.9 HEMORRHOIDS, UNSPECIFIED HEMORRHOID TYPE: ICD-10-CM

## 2023-11-29 DIAGNOSIS — J01.90 ACUTE BACTERIAL SINUSITIS: ICD-10-CM

## 2023-11-29 DIAGNOSIS — E78.2 MIXED HYPERLIPIDEMIA: ICD-10-CM

## 2023-11-29 DIAGNOSIS — E55.9 MILD VITAMIN D DEFICIENCY: ICD-10-CM

## 2023-11-29 DIAGNOSIS — R35.1 NOCTURIA: ICD-10-CM

## 2023-11-29 DIAGNOSIS — B96.89 ACUTE BACTERIAL SINUSITIS: ICD-10-CM

## 2023-11-29 DIAGNOSIS — Z00.00 ANNUAL PHYSICAL EXAM: ICD-10-CM

## 2023-11-29 DIAGNOSIS — E66.01 SEVERE OBESITY (BMI 35.0-35.9 WITH COMORBIDITY): ICD-10-CM

## 2023-11-29 DIAGNOSIS — Z00.00 ANNUAL PHYSICAL EXAM: Primary | ICD-10-CM

## 2023-11-29 LAB
ALBUMIN SERPL BCP-MCNC: 3.8 G/DL (ref 3.5–5.2)
ALP SERPL-CCNC: 101 U/L (ref 55–135)
ALT SERPL W/O P-5'-P-CCNC: 34 U/L (ref 10–44)
ANION GAP SERPL CALC-SCNC: 9 MMOL/L (ref 8–16)
AST SERPL-CCNC: 33 U/L (ref 10–40)
BASOPHILS # BLD AUTO: 0.09 K/UL (ref 0–0.2)
BASOPHILS NFR BLD: 0.9 % (ref 0–1.9)
BILIRUB SERPL-MCNC: 0.2 MG/DL (ref 0.1–1)
BUN SERPL-MCNC: 19 MG/DL (ref 6–20)
CALCIUM SERPL-MCNC: 9.7 MG/DL (ref 8.7–10.5)
CHLORIDE SERPL-SCNC: 104 MMOL/L (ref 95–110)
CHOLEST SERPL-MCNC: 238 MG/DL (ref 120–199)
CHOLEST/HDLC SERPL: 5.5 {RATIO} (ref 2–5)
CO2 SERPL-SCNC: 25 MMOL/L (ref 23–29)
CREAT SERPL-MCNC: 0.9 MG/DL (ref 0.5–1.4)
DIFFERENTIAL METHOD: ABNORMAL
EOSINOPHIL # BLD AUTO: 0.5 K/UL (ref 0–0.5)
EOSINOPHIL NFR BLD: 4.6 % (ref 0–8)
ERYTHROCYTE [DISTWIDTH] IN BLOOD BY AUTOMATED COUNT: 17.3 % (ref 11.5–14.5)
EST. GFR  (NO RACE VARIABLE): >60 ML/MIN/1.73 M^2
ESTIMATED AVG GLUCOSE: 114 MG/DL (ref 68–131)
GLUCOSE SERPL-MCNC: 65 MG/DL (ref 70–110)
HBA1C MFR BLD: 5.6 % (ref 4–5.6)
HCT VFR BLD AUTO: 41.8 % (ref 40–54)
HDLC SERPL-MCNC: 43 MG/DL (ref 40–75)
HDLC SERPL: 18.1 % (ref 20–50)
HGB BLD-MCNC: 13 G/DL (ref 14–18)
IMM GRANULOCYTES # BLD AUTO: 0.03 K/UL (ref 0–0.04)
IMM GRANULOCYTES NFR BLD AUTO: 0.3 % (ref 0–0.5)
LDLC SERPL CALC-MCNC: 158.6 MG/DL (ref 63–159)
LYMPHOCYTES # BLD AUTO: 3.7 K/UL (ref 1–4.8)
LYMPHOCYTES NFR BLD: 35.4 % (ref 18–48)
MCH RBC QN AUTO: 25.2 PG (ref 27–31)
MCHC RBC AUTO-ENTMCNC: 31.1 G/DL (ref 32–36)
MCV RBC AUTO: 81 FL (ref 82–98)
MONOCYTES # BLD AUTO: 1.1 K/UL (ref 0.3–1)
MONOCYTES NFR BLD: 10.4 % (ref 4–15)
NEUTROPHILS # BLD AUTO: 5.1 K/UL (ref 1.8–7.7)
NEUTROPHILS NFR BLD: 48.4 % (ref 38–73)
NONHDLC SERPL-MCNC: 195 MG/DL
NRBC BLD-RTO: 0 /100 WBC
PLATELET # BLD AUTO: 464 K/UL (ref 150–450)
PMV BLD AUTO: 9.6 FL (ref 9.2–12.9)
POTASSIUM SERPL-SCNC: 3.9 MMOL/L (ref 3.5–5.1)
PROT SERPL-MCNC: 8.8 G/DL (ref 6–8.4)
RBC # BLD AUTO: 5.16 M/UL (ref 4.6–6.2)
SODIUM SERPL-SCNC: 138 MMOL/L (ref 136–145)
TRIGL SERPL-MCNC: 182 MG/DL (ref 30–150)
TSH SERPL DL<=0.005 MIU/L-ACNC: 2.05 UIU/ML (ref 0.4–4)
WBC # BLD AUTO: 10.44 K/UL (ref 3.9–12.7)

## 2023-11-29 PROCEDURE — 80061 LIPID PANEL: CPT | Performed by: INTERNAL MEDICINE

## 2023-11-29 PROCEDURE — 99999 PR PBB SHADOW E&M-EST. PATIENT-LVL III: CPT | Mod: PBBFAC,,, | Performed by: INTERNAL MEDICINE

## 2023-11-29 PROCEDURE — 36415 COLL VENOUS BLD VENIPUNCTURE: CPT | Performed by: INTERNAL MEDICINE

## 2023-11-29 PROCEDURE — 99396 PREV VISIT EST AGE 40-64: CPT | Mod: S$PBB,,, | Performed by: INTERNAL MEDICINE

## 2023-11-29 PROCEDURE — 84443 ASSAY THYROID STIM HORMONE: CPT | Performed by: INTERNAL MEDICINE

## 2023-11-29 PROCEDURE — 85025 COMPLETE CBC W/AUTO DIFF WBC: CPT | Performed by: INTERNAL MEDICINE

## 2023-11-29 PROCEDURE — 83036 HEMOGLOBIN GLYCOSYLATED A1C: CPT | Performed by: INTERNAL MEDICINE

## 2023-11-29 PROCEDURE — 99999PBSHW FLU VACCINE (QUAD) GREATER THAN OR EQUAL TO 3YO PRESERVATIVE FREE IM: ICD-10-PCS | Mod: PBBFAC,,,

## 2023-11-29 PROCEDURE — 99396 PR PREVENTIVE VISIT,EST,40-64: ICD-10-PCS | Mod: S$PBB,,, | Performed by: INTERNAL MEDICINE

## 2023-11-29 PROCEDURE — 80053 COMPREHEN METABOLIC PANEL: CPT | Performed by: INTERNAL MEDICINE

## 2023-11-29 PROCEDURE — 99999 PR PBB SHADOW E&M-EST. PATIENT-LVL III: ICD-10-PCS | Mod: PBBFAC,,, | Performed by: INTERNAL MEDICINE

## 2023-11-29 PROCEDURE — 99999PBSHW FLU VACCINE (QUAD) GREATER THAN OR EQUAL TO 3YO PRESERVATIVE FREE IM: Mod: PBBFAC,,,

## 2023-11-29 PROCEDURE — 90471 IMMUNIZATION ADMIN: CPT | Mod: PBBFAC

## 2023-11-29 PROCEDURE — 99213 OFFICE O/P EST LOW 20 MIN: CPT | Mod: PBBFAC | Performed by: INTERNAL MEDICINE

## 2023-11-29 RX ORDER — AMOXICILLIN AND CLAVULANATE POTASSIUM 875; 125 MG/1; MG/1
1 TABLET, FILM COATED ORAL 2 TIMES DAILY
Qty: 20 TABLET | Refills: 0 | Status: SHIPPED | OUTPATIENT
Start: 2023-11-29
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
## 2023-11-30 ENCOUNTER — PATIENT MESSAGE (OUTPATIENT)
Dept: INTERNAL MEDICINE | Facility: CLINIC | Age: 43
End: 2023-11-30

## 2023-11-30 ENCOUNTER — TELEPHONE (OUTPATIENT)
Dept: INTERNAL MEDICINE | Facility: CLINIC | Age: 43
End: 2023-11-30

## 2023-11-30 DIAGNOSIS — D64.9 ANEMIA, UNSPECIFIED TYPE: Primary | ICD-10-CM

## 2023-11-30 DIAGNOSIS — R77.9 ELEVATED BLOOD PROTEIN: ICD-10-CM

## 2023-11-30 NOTE — TELEPHONE ENCOUNTER
Called and spoke with pt, informed him of recent blood work results. Pt states he was aware and wrote Dr. Constantino a message. Pt does not want to schedule any blood work right now, states he wants to wait until next year

## 2023-11-30 NOTE — TELEPHONE ENCOUNTER
I responded to his my [facility] message.  I do not think waiting a year is a good idea for these labs

## 2023-11-30 NOTE — TELEPHONE ENCOUNTER
The 10-year ASCVD risk score (Vega PEDROZA, et al., 2019) is: 2.4%    Values used to calculate the score:      Age: 43 years      Sex: Male      Is Non- : No      Diabetic: No      Tobacco smoker: No      Systolic Blood Pressure: 135 mmHg      Is BP treated: No      HDL Cholesterol: 43 mg/dL      Total Cholesterol: 238 mg/dL     Please let him know labs show quite a few findings:  Worsening anemia  Persistently elevated cholesterol, although slightly better than last year, not to the point where treatment is needed  Elevated protein in the blood    I am recommending some additional blood work which I have ordered to evaluate his anemia and elevated protein.  Please assist with scheduling and let me know when done, thank you

## 2024-06-10 ENCOUNTER — PATIENT MESSAGE (OUTPATIENT)
Dept: INTERNAL MEDICINE | Facility: CLINIC | Age: 44
End: 2024-06-10

## 2024-07-22 ENCOUNTER — E-VISIT (OUTPATIENT)
Dept: INTERNAL MEDICINE | Facility: CLINIC | Age: 44
End: 2024-07-22

## 2024-07-22 DIAGNOSIS — J01.90 ACUTE BACTERIAL SINUSITIS: Primary | ICD-10-CM

## 2024-07-22 DIAGNOSIS — B96.89 ACUTE BACTERIAL SINUSITIS: Primary | ICD-10-CM

## 2024-07-23 RX ORDER — AMOXICILLIN AND CLAVULANATE POTASSIUM 875; 125 MG/1; MG/1
1 TABLET, FILM COATED ORAL 2 TIMES DAILY
Qty: 20 TABLET | Refills: 0 | Status: SHIPPED | OUTPATIENT
Start: 2024-07-23

## 2024-07-23 NOTE — PROGRESS NOTES
Patient ID: Junior Silva is a 44 y.o. male.    Chief Complaint: URI (Entered automatically based on patient selection in [facility].)    The patient initiated a request through [facility] on 7/22/2024 for evaluation and management with a chief complaint of URI (Entered automatically based on patient selection in [facility].)     I evaluated the questionnaire submission on 7/22/24.    [facility] E-Visit Covid    7/22/2024  8:58 PM CDT - Filed by Patient   Do you agree to participate in an E-Visit? Yes   If you have any of the following symptoms, go to your local emergency room or call 911: I acknowledge   What is the main issue you would like addressed today? Upper sinus respiratory infection   Do you think you might have COVID or the Flu? No   Have you tested positive for COVID or Flu? No   What symptoms do you currently have?  Nasal Congestion;  New loss of taste or smell;  Runny nose;  Sore throat;  Pain around the nose and face   Have you had any of the following? None of the above   Have you ever smoked? I have never smoked   Have you had a fever? No   When did your symptoms first appear? 7/14/2024   In the last two weeks, have you been in close contact with someone who has COVID-19 or the Flu? No   List what you have done or taken to help your symptoms. Zicam anefrin nasal sprays and ibuprofen   How severe are your symptoms? Mild   Have your symptoms gotten better or worse since they started?  No change   Do you have transportation to get testing if it is needed and ordered for you at an [facility] location? Yes   Provide any additional information you feel is important.    Please attach any relevant images or files    Are you able to take your vital signs? No         Encounter Diagnosis   Name Primary?    Acute bacterial sinusitis Yes        No orders of the defined types were placed in this encounter.     Medications Ordered This Encounter   Medications    amoxicillin-clavulanate 875-125mg (AUGMENTIN) 875-125 mg per  tablet     Sig: Take 1 tablet by mouth 2 (two) times daily.     Dispense:  20 tablet     Refill:  0        No follow-ups on file.      E-Visit Time Tracking:    Day 1 Time (in minutes): 6    Total Time (in minutes): 6           Rest, fluids, acetaminophen, mucinex and follow up poor results.

## 2025-01-02 ENCOUNTER — LAB VISIT (OUTPATIENT)
Dept: LAB | Facility: HOSPITAL | Age: 45
End: 2025-01-02
Attending: INTERNAL MEDICINE
Payer: COMMERCIAL

## 2025-01-02 ENCOUNTER — OFFICE VISIT (OUTPATIENT)
Dept: FAMILY MEDICINE | Facility: CLINIC | Age: 45
End: 2025-01-02
Payer: COMMERCIAL

## 2025-01-02 VITALS
HEIGHT: 71 IN | SYSTOLIC BLOOD PRESSURE: 130 MMHG | WEIGHT: 269 LBS | BODY MASS INDEX: 37.66 KG/M2 | DIASTOLIC BLOOD PRESSURE: 80 MMHG

## 2025-01-02 DIAGNOSIS — H93.13 TINNITUS OF BOTH EARS: ICD-10-CM

## 2025-01-02 DIAGNOSIS — Z23 IMMUNIZATION DUE: ICD-10-CM

## 2025-01-02 DIAGNOSIS — E66.01 SEVERE OBESITY (BMI 35.0-35.9 WITH COMORBIDITY): ICD-10-CM

## 2025-01-02 DIAGNOSIS — Z00.00 ANNUAL PHYSICAL EXAM: Primary | ICD-10-CM

## 2025-01-02 DIAGNOSIS — B96.89 ACUTE BACTERIAL SINUSITIS: ICD-10-CM

## 2025-01-02 DIAGNOSIS — K64.9 HEMORRHOIDS, UNSPECIFIED HEMORRHOID TYPE: ICD-10-CM

## 2025-01-02 DIAGNOSIS — H91.93 BILATERAL HEARING LOSS, UNSPECIFIED HEARING LOSS TYPE: ICD-10-CM

## 2025-01-02 DIAGNOSIS — E55.9 MILD VITAMIN D DEFICIENCY: ICD-10-CM

## 2025-01-02 DIAGNOSIS — Z00.00 ANNUAL PHYSICAL EXAM: ICD-10-CM

## 2025-01-02 DIAGNOSIS — J01.90 ACUTE BACTERIAL SINUSITIS: ICD-10-CM

## 2025-01-02 DIAGNOSIS — E78.2 MIXED HYPERLIPIDEMIA: ICD-10-CM

## 2025-01-02 DIAGNOSIS — D75.839 THROMBOCYTOSIS: ICD-10-CM

## 2025-01-02 LAB
BILIRUB UR QL STRIP: NEGATIVE
CLARITY UR: CLEAR
COLOR UR: YELLOW
GLUCOSE UR QL STRIP: NEGATIVE
HGB UR QL STRIP: NEGATIVE
KETONES UR QL STRIP: NEGATIVE
LEUKOCYTE ESTERASE UR QL STRIP: NEGATIVE
NITRITE UR QL STRIP: NEGATIVE
PH UR STRIP: 6 [PH] (ref 5–8)
PROT UR QL STRIP: ABNORMAL
SP GR UR STRIP: >1.03 (ref 1–1.03)
URN SPEC COLLECT METH UR: ABNORMAL
UROBILINOGEN UR STRIP-ACNC: NEGATIVE EU/DL

## 2025-01-02 PROCEDURE — 81003 URINALYSIS AUTO W/O SCOPE: CPT | Performed by: INTERNAL MEDICINE

## 2025-01-02 PROCEDURE — 99999 PR PBB SHADOW E&M-EST. PATIENT-LVL III: CPT | Mod: PBBFAC,,, | Performed by: INTERNAL MEDICINE

## 2025-01-02 RX ORDER — AMOXICILLIN AND CLAVULANATE POTASSIUM 875; 125 MG/1; MG/1
1 TABLET, FILM COATED ORAL 2 TIMES DAILY
Qty: 20 TABLET | Refills: 0 | Status: SHIPPED | OUTPATIENT
Start: 2025-01-02

## 2025-01-02 NOTE — PROGRESS NOTES
Patient ID: Junior Silva is a 44 y.o. male.    Chief Complaint: Annual Exam      Assessment:       1. Annual physical exam    2. Severe obesity (BMI 35.0-35.9 with comorbidity)    3. Mixed hyperlipidemia    4. Mild vitamin D deficiency    5. Hemorrhoids, unspecified hemorrhoid type    6. Thrombocytosis    7. Bilateral hearing loss, unspecified hearing loss type    8. Tinnitus of both ears    9. Acute bacterial sinusitis    10. Immunization due       Plan:           1. Annual physical exam  -     CBC Auto Differential; Future; Expected date: 01/02/2025  -     Comprehensive Metabolic Panel; Future; Expected date: 01/02/2025  -     Lipid Panel; Future; Expected date: 01/02/2025  -     Hemoglobin A1C; Future; Expected date: 01/02/2025  -     TSH; Future; Expected date: 01/02/2025  -     Iron and TIBC; Future; Expected date: 01/02/2025  -     Ferritin; Future; Expected date: 01/02/2025  -     Protein Electrophoresis, Serum; Future; Expected date: 01/02/2025  -     Vitamin B12; Future; Expected date: 01/02/2025  -     Vitamin D; Future; Expected date: 01/02/2025  -     Immunofixation Electrophoresis; Future; Expected date: 01/02/2025  -     Urinalysis, Reflex to Urine Culture Urine, Clean Catch; Future; Expected date: 01/02/2025    2. Severe obesity (BMI 35.0-35.9 with comorbidity)    3. Mixed hyperlipidemia    4. Mild vitamin D deficiency    5. Hemorrhoids, unspecified hemorrhoid type    6. Thrombocytosis    7. Bilateral hearing loss, unspecified hearing loss type    8. Tinnitus of both ears    9. Acute bacterial sinusitis  -     amoxicillin-clavulanate 875-125mg (AUGMENTIN) 875-125 mg per tablet; Take 1 tablet by mouth 2 (two) times daily.  Dispense: 20 tablet; Refill: 0    10. Immunization due  -     influenza (Flulaval, Fluzone, Fluarix) 45 mcg/0.5 mL IM vaccine (> or = 6 mo) 0.5 mL       Exercise, sleep hygiene and lifestyle issues reviewed  Labs and review  Antibiotics  Rest, fluids, acetaminophen, mucinex and  follow up poor results.  Flu shot today  He will catch up COVID booster  If hearing loss and tinnitus persist after URI treated, he will let me know and we can arrange for ENT assessment    Subjective:   CHIEF COMPLAINT:  Patient presents today for annual exam.    He has had ear pain and some slight hearing loss over the course of the last several months.  He thinks he may have a sinus infection.    He is trying to get good sleep.  He does snore as does his wife.  Sleep apnea was reviewed.  He does not know whether he has apnea, discussed the recommendation for him to have a sleep study, he is ambivalent.    Weight issues reviewed, BMI 37.  He is planning to work on exercise and portion control.    He is due for labs.  He has historically had hyperlipidemia.    He has nocturia x1 or 2.  He denies any dysuria or hematuria.  He thinks this is partly habits as he drinks a lot of water.    ROS:  General: -fever, -chills, -fatigue, -weight gain, -weight loss  Eyes: -vision changes, -redness, -discharge  ENT: +ear pain, -nasal congestion, -sore throat + sinus congestion  Cardiovascular: -chest pain, -palpitations, -lower extremity edema  Respiratory: -cough, -shortness of breath  Gastrointestinal: -abdominal pain, -nausea, -vomiting, -diarrhea, -constipation, -blood in stool.  Hemorrhoids and GERD symptoms better currently  Genitourinary: -dysuria, -hematuria, -frequency  Musculoskeletal: -joint pain, -muscle pain  Skin: -rash, -lesion  Neurological: -headache, -dizziness, -numbness, -tingling  Psychiatric: -anxiety, -depression, -sleep difficulty          Patient Active Problem List   Diagnosis    Mixed hyperlipidemia    Mild vitamin D deficiency    Gastroesophageal reflux disease without esophagitis    Hemorrhoids    Severe obesity (BMI 35.0-35.9 with comorbidity)    Thrombocytosis          Objective:      Physical Exam  Constitutional:       Appearance: He is well-developed.   HENT:      Head: Normocephalic and  atraumatic.      Right Ear: External ear normal.      Left Ear: External ear normal.      Ears:      Comments: TMs erythematous and dull bilaterally  Eyes:      Extraocular Movements: Extraocular movements intact.      Conjunctiva/sclera: Conjunctivae normal.   Neck:      Thyroid: No thyromegaly.   Cardiovascular:      Rate and Rhythm: Normal rate and regular rhythm.      Heart sounds: No murmur heard.  Pulmonary:      Effort: Pulmonary effort is normal. No respiratory distress.      Breath sounds: Normal breath sounds. No wheezing.   Abdominal:      General: There is no distension.      Palpations: Abdomen is soft.      Tenderness: There is no abdominal tenderness.   Musculoskeletal:         General: No tenderness.      Cervical back: Normal range of motion and neck supple.      Right lower leg: No edema.      Left lower leg: No edema.   Lymphadenopathy:      Cervical: No cervical adenopathy.   Skin:     General: Skin is warm and dry.   Neurological:      General: No focal deficit present.      Mental Status: He is alert and oriented to person, place, and time.      Cranial Nerves: No cranial nerve deficit.   Psychiatric:         Mood and Affect: Mood normal.         Behavior: Behavior normal.         Thought Content: Thought content normal.         Judgment: Judgment normal.             Health Maintenance Due   Topic Date Due    COVID-19 Vaccine (4 - 2024-25 season) 09/01/2024        This note was generated with the assistance of ambient listening technology. Verbal consent was obtained by the patient and accompanying visitor(s) for the recording of patient appointment to facilitate this note. I attest to having reviewed and edited the generated note for accuracy, though some syntax or spelling errors may persist. Please contact the author of this note for any clarification.         Answers submitted by the patient for this visit:  Review of Systems Questionnaire (Submitted on 1/1/2025)  activity change:  No  unexpected weight change: No  neck pain: No  hearing loss: No  rhinorrhea: No  trouble swallowing: No  eye discharge: No  visual disturbance: No  chest tightness: No  wheezing: No  chest pain: No  palpitations: No  blood in stool: No  constipation: No  vomiting: No  diarrhea: No  polydipsia: No  polyuria: No  difficulty urinating: No  urgency: No  hematuria: No  joint swelling: No  arthralgias: No  headaches: No  weakness: No  confusion: No  dysphoric mood: No

## 2025-01-14 ENCOUNTER — PATIENT MESSAGE (OUTPATIENT)
Dept: FAMILY MEDICINE | Facility: CLINIC | Age: 45
End: 2025-01-14
Payer: COMMERCIAL

## 2025-01-14 DIAGNOSIS — Z13.6 ENCOUNTER FOR SCREENING FOR CARDIOVASCULAR DISORDERS: Primary | ICD-10-CM

## 2025-01-14 DIAGNOSIS — H93.19 TINNITUS, UNSPECIFIED LATERALITY: ICD-10-CM

## 2025-01-14 NOTE — TELEPHONE ENCOUNTER
OK for ENT appt- referral is in  2.  I did order coronary CT- orders in  3. Can offer a virtual visit to discuss if he would like (as he sent 10 separate messages)

## 2025-01-31 ENCOUNTER — OFFICE VISIT (OUTPATIENT)
Dept: OTOLARYNGOLOGY | Facility: CLINIC | Age: 45
End: 2025-01-31
Payer: COMMERCIAL

## 2025-01-31 ENCOUNTER — CLINICAL SUPPORT (OUTPATIENT)
Dept: OTOLARYNGOLOGY | Facility: CLINIC | Age: 45
End: 2025-01-31
Payer: COMMERCIAL

## 2025-01-31 VITALS
HEART RATE: 64 BPM | BODY MASS INDEX: 37.44 KG/M2 | SYSTOLIC BLOOD PRESSURE: 126 MMHG | DIASTOLIC BLOOD PRESSURE: 82 MMHG | WEIGHT: 268.44 LBS

## 2025-01-31 DIAGNOSIS — H90.0 CONDUCTIVE HEARING LOSS, BILATERAL: ICD-10-CM

## 2025-01-31 DIAGNOSIS — H69.93 EUSTACHIAN TUBE DYSFUNCTION, BILATERAL: Primary | ICD-10-CM

## 2025-01-31 DIAGNOSIS — H93.19 TINNITUS, UNSPECIFIED LATERALITY: ICD-10-CM

## 2025-01-31 DIAGNOSIS — R42 DIZZINESS: ICD-10-CM

## 2025-01-31 DIAGNOSIS — H93.13 TINNITUS OF BOTH EARS: ICD-10-CM

## 2025-01-31 DIAGNOSIS — H90.0 CONDUCTIVE HEARING LOSS OF BOTH EARS: ICD-10-CM

## 2025-01-31 DIAGNOSIS — J30.89 NON-SEASONAL ALLERGIC RHINITIS, UNSPECIFIED TRIGGER: ICD-10-CM

## 2025-01-31 DIAGNOSIS — H69.93 DYSFUNCTION OF BOTH EUSTACHIAN TUBES: Primary | ICD-10-CM

## 2025-01-31 PROCEDURE — 3074F SYST BP LT 130 MM HG: CPT | Mod: CPTII,S$GLB,, | Performed by: NURSE PRACTITIONER

## 2025-01-31 PROCEDURE — 99999 PR PBB SHADOW E&M-EST. PATIENT-LVL III: CPT | Mod: PBBFAC,,, | Performed by: NURSE PRACTITIONER

## 2025-01-31 PROCEDURE — 99999 PR PBB SHADOW E&M-EST. PATIENT-LVL II: CPT | Mod: PBBFAC,,,

## 2025-01-31 PROCEDURE — 3044F HG A1C LEVEL LT 7.0%: CPT | Mod: CPTII,S$GLB,, | Performed by: NURSE PRACTITIONER

## 2025-01-31 PROCEDURE — 99204 OFFICE O/P NEW MOD 45 MIN: CPT | Mod: S$GLB,,, | Performed by: NURSE PRACTITIONER

## 2025-01-31 PROCEDURE — 1159F MED LIST DOCD IN RCRD: CPT | Mod: CPTII,S$GLB,, | Performed by: NURSE PRACTITIONER

## 2025-01-31 PROCEDURE — 1160F RVW MEDS BY RX/DR IN RCRD: CPT | Mod: CPTII,S$GLB,, | Performed by: NURSE PRACTITIONER

## 2025-01-31 PROCEDURE — 92567 TYMPANOMETRY: CPT | Mod: S$GLB,,,

## 2025-01-31 PROCEDURE — 92557 COMPREHENSIVE HEARING TEST: CPT | Mod: S$GLB,,,

## 2025-01-31 PROCEDURE — 3079F DIAST BP 80-89 MM HG: CPT | Mod: CPTII,S$GLB,, | Performed by: NURSE PRACTITIONER

## 2025-01-31 PROCEDURE — 3008F BODY MASS INDEX DOCD: CPT | Mod: CPTII,S$GLB,, | Performed by: NURSE PRACTITIONER

## 2025-01-31 RX ORDER — FLUTICASONE PROPIONATE 50 MCG
2 SPRAY, SUSPENSION (ML) NASAL DAILY
Qty: 9.9 ML | Refills: 11 | Status: SHIPPED | OUTPATIENT
Start: 2025-01-31 | End: 2025-01-31 | Stop reason: SDUPTHER

## 2025-01-31 RX ORDER — CETIRIZINE HYDROCHLORIDE 10 MG/1
10 TABLET ORAL DAILY
Qty: 30 TABLET | Refills: 11 | Status: SHIPPED | OUTPATIENT
Start: 2025-01-31 | End: 2025-01-31 | Stop reason: SDUPTHER

## 2025-01-31 RX ORDER — METHYLPREDNISOLONE 4 MG/1
TABLET ORAL
Qty: 21 EACH | Refills: 0 | Status: SHIPPED | OUTPATIENT
Start: 2025-01-31 | End: 2025-01-31 | Stop reason: SDUPTHER

## 2025-01-31 RX ORDER — METHYLPREDNISOLONE 4 MG/1
TABLET ORAL
Qty: 21 EACH | Refills: 0 | Status: SHIPPED | OUTPATIENT
Start: 2025-01-31 | End: 2025-02-21

## 2025-01-31 RX ORDER — CETIRIZINE HYDROCHLORIDE 10 MG/1
10 TABLET ORAL DAILY
Qty: 30 TABLET | Refills: 11 | Status: SHIPPED | OUTPATIENT
Start: 2025-01-31 | End: 2026-01-31

## 2025-01-31 RX ORDER — FLUTICASONE PROPIONATE 50 MCG
2 SPRAY, SUSPENSION (ML) NASAL DAILY
Qty: 9.9 ML | Refills: 11 | Status: SHIPPED | OUTPATIENT
Start: 2025-01-31

## 2025-01-31 NOTE — PROGRESS NOTES
"  Chief Complaint   Patient presents with    Tinnitus    Hearing Loss     States just finished antibiotics for ear infection       HPI: Junior Silva is a 44 y.o. male who is referred to me by Dr. Ragini Constantino in consultation for possible otitis media . Junior Silva reported many episodes of otitis media in the past that required PE tubes. He felt like he has been having bilateral ear infection since last year due to muffled hearing. He went his PCP on 1/2/2025 and was rx'ed Augmentin bid x 10 days for sinusitis. He reports having sneezing and dizzy spells while he was on Augmentin. Reports spinning sensation for well over 20 minutes after he sneezes. His dizziness has resolved after he completed the Augmentin.   He perceived tinnitus in both ears. 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   He also reports of seasonal allergies with headaches and nasal congestion. He states that he had the allergy test over 10 years ago and was shown to have sensitivity to dust mites. He is currently not on any nasal sprays or antihistamines.     Past Medical History:   Diagnosis Date    Allergy     Gastroesophageal reflux disease without esophagitis 10/06/2015    Hyperlipidemia     Mild vitamin D deficiency 11/13/2012    Rectal bleeding     Severe obesity (BMI 35.0-35.9 with comorbidity) 01/14/2019     Social History     Socioeconomic History    Marital status:     Number of children: 1   Tobacco Use    Smoking status: Never    Smokeless tobacco: Never   Substance and Sexual Activity    Alcohol use: Yes    Drug use: No    Sexual activity: Yes     Partners: Female     Social Drivers of Health     Financial Resource Strain: Low Risk  (11/23/2023)    Overall Financial Resource Strain (CARDIA)     Difficulty of Paying Living Expenses: Not hard at all   Food Insecurity: No Food Insecurity (11/23/2023)    Hunger Vital Sign     Worried About Running Out of Food in the Last Year: Never true     " Ran Out of Food in the Last Year: Never true   Transportation Needs: No Transportation Needs (11/23/2023)    PRAPARE - Transportation     Lack of Transportation (Medical): No     Lack of Transportation (Non-Medical): No   Physical Activity: Patient Declined (11/23/2023)    Exercise Vital Sign     Days of Exercise per Week: Patient declined     Minutes of Exercise per Session: Patient declined   Stress: No Stress Concern Present (11/23/2023)    [employer] - Occupational Stress Questionnaire     Feeling of Stress : Not at all   Housing Stability: Low Risk  (11/23/2023)    Housing Stability Vital Sign     Unable to Pay for Housing in the Last Year: No     Number of Places Lived in the Last Year: 1     Unstable Housing in the Last Year: No     Past Surgical History:   Procedure Laterality Date    COLONOSCOPY N/A 11/12/2015    Procedure: COLONOSCOPY;  Surgeon: Genaro Wright MD;  Location: [address];  Service: Endoscopy;  Laterality: N/A;     Family History   Problem Relation Name Age of Onset    Hyperlipidemia Mother      Hypertension Mother      Cancer Father          pancreatic    No Known Problems Daughter Betty     Cancer Paternal Uncle  84        Colon           Review of Systems  General: negative for chills, fever or weight loss  Psychological: negative for mood changes or depression  Ophthalmic: negative for blurry vision, photophobia or eye pain  ENT: see HPI  Respiratory: no cough, shortness of breath, or wheezing  Cardiovascular: no chest pain or dyspnea on exertion  Gastrointestinal: no abdominal pain, change in bowel habits, or black/ bloody stools  Musculoskeletal: negative for gait disturbance or muscular weakness  Neurological: no syncope or seizures; no ataxia  Dermatological: negative for pruritis,  rash and jaundice  Hematologic/lymphatic: no easy bruising, no new adenopathy      Physical Exam:    Vitals:    01/31/25 0945   BP: 126/82   Pulse: 64        Constitutional: Well appearing / communicating without difficutly.  NAD.  Eyes: EOM I Bilaterally  Head/Face: Normocephalic.  Negative paranasal sinus pressure/tenderness.  Salivary glands WNL.  House Brackmann I Bilaterally.    Right Ear: Auricle normal appearance. External Auditory Canal within normal limits; no lesions or masses,TM w/o masses/lesions/perforations. TM retracted, +auto-insufflation   Left Ear: Auricle normal appearance. External Auditory Canal within normal limits; no lesions or masses,TM w/o masses/lesions/perforations. TM retracted, +auto-insufflation   Vestibular exam:  negative Right Dixx- Hallpike; negative left Dixx- Hallpike  Nose: No gross nasal septal deviation. Inferior Turbinates 3+ bilaterally. No septal perforation. No masses/lesions. External nasal skin appears normal without masses/lesions.  Oral Cavity: Gingiva/lips within normal limits.  Dentition/gingiva healthy appearing. Mucus membranes moist. Floor of mouth soft, no masses palpated. Oral Tongue mobile. Hard Palate appears normal.    Oropharynx: Base of tongue appears normal. No masses/lesions noted. Tonsillar fossa/pharyngeal wall without lesions. Posterior oropharynx WNL.  Soft palate without masses. Midline uvula.   Neck/Lymphatic: No LAD I-VI bilaterally.  No thyromegaly.  No masses noted on exam.    Mirror laryngoscopy/nasopharyngoscopy: Active gag reflex.  Unable to perform.    Neuro/Psychiatric: AOx3.  Normal mood and affect.   Cardiovascular: Normal carotid pulses bilaterally, no increasing jugular venous distention noted at cervical region bilaterally.    Respiratory: Normal respiratory effort, no stridor, no retractions noted.      Audiogram interpreted personally by me and discussed in detail with the patient today.   Pure tone testing revealed mild conductive loss in both low and high frequencies, bilaterally. Speech reception thresholds were obtained at 15 dBHL in the right ear and 15 dBHL in the left ear.  Speech discrimination scores were 100% in the right ear and 92% in the left ear. Tympanometry revealed Type B tympanograms in both ears.       Assessment:    ICD-10-CM ICD-9-CM    1. Dysfunction of both eustachian tubes  H69.93 381.81       2. Non-seasonal allergic rhinitis, unspecified trigger  J30.89 477.8       3. Conductive hearing loss, bilateral  H90.0 389.06       4. Tinnitus of both ears  H93.13 388.30       5. Dizziness  R42 780.4         The primary encounter diagnosis was Dysfunction of both eustachian tubes. Diagnoses of Non-seasonal allergic rhinitis, unspecified trigger, Conductive hearing loss, bilateral, Tinnitus of both ears, and Dizziness were also pertinent to this visit.      Plan:  No orders of the defined types were placed in this encounter.    -We had a long discussion regarding the anatomy and function of the eustachian tube.  We discussed that the eustachian tube acts as a pump to keep the appropriate amount of pressure behind the ear drum.  I gave the patient a prescription for a nasal steroid spray to be used on a daily basis, and we discussed that it will take 2-3 weeks of daily use to achieve maximal effectiveness.  We also discussed otologic valsalva daily for gently depressurizing the middle ear.    -dizziness has resolved since last week. Dixx-hallpike negative bilaterally.  If dizziness returns, would consider VNG.   -start on nasal saline rinses twice a day  -start on Medrol Dosepak  -start on Flonase 2 sprays in each nostril daily  -start on cetirizine  -follow up 3-4 weeks with repeat audiogram. He knows to return sooner if there's any acute changes.       Nidhi Mejia NP

## 2025-01-31 NOTE — PROGRESS NOTES
"Junior Silva, a 44 y.o. male was seen today in the clinic for an audiologic evaluation. The patient's primary complaint was reduced hearing perception, worse in the left ear.  He perceived tinnitus in both ears. Mr. Silva experiences dizziness when attempting to "pop" his ears. 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     Pure tone testing revealed mild conductive loss in both low and high frequencies, bilaterally. Speech reception thresholds were obtained at 15 dBHL in the right ear and 15 dBHL in the left ear. Speech discrimination scores were 100% in the right ear and 92% in the left ear. Tympanometry revealed Type B tympanograms in both ears.    Recommendations:  Otologic evaluation  Repeat audiologic evaluation at ENT follow up   Hearing protection in noise        "

## 2025-02-28 ENCOUNTER — OFFICE VISIT (OUTPATIENT)
Dept: OTOLARYNGOLOGY | Facility: CLINIC | Age: 45
End: 2025-02-28
Payer: COMMERCIAL

## 2025-02-28 ENCOUNTER — CLINICAL SUPPORT (OUTPATIENT)
Dept: OTOLARYNGOLOGY | Facility: CLINIC | Age: 45
End: 2025-02-28
Payer: COMMERCIAL

## 2025-02-28 VITALS
BODY MASS INDEX: 38.28 KG/M2 | DIASTOLIC BLOOD PRESSURE: 91 MMHG | WEIGHT: 274.5 LBS | SYSTOLIC BLOOD PRESSURE: 142 MMHG | HEART RATE: 80 BPM

## 2025-02-28 DIAGNOSIS — R42 DIZZINESS: ICD-10-CM

## 2025-02-28 DIAGNOSIS — H66.93 BILATERAL OTITIS MEDIA, UNSPECIFIED OTITIS MEDIA TYPE: ICD-10-CM

## 2025-02-28 DIAGNOSIS — H90.0 CONDUCTIVE HEARING LOSS, BILATERAL: Primary | ICD-10-CM

## 2025-02-28 DIAGNOSIS — H69.93 EUSTACHIAN TUBE DYSFUNCTION, BILATERAL: ICD-10-CM

## 2025-02-28 PROCEDURE — 99999 PR PBB SHADOW E&M-EST. PATIENT-LVL I: CPT | Mod: PBBFAC,,,

## 2025-02-28 PROCEDURE — 99999 PR PBB SHADOW E&M-EST. PATIENT-LVL III: CPT | Mod: PBBFAC,,, | Performed by: NURSE PRACTITIONER

## 2025-02-28 RX ORDER — AMOXICILLIN AND CLAVULANATE POTASSIUM 875; 125 MG/1; MG/1
1 TABLET, FILM COATED ORAL 2 TIMES DAILY
Qty: 20 TABLET | Refills: 0 | Status: SHIPPED | OUTPATIENT
Start: 2025-02-28 | End: 2025-02-28 | Stop reason: SDUPTHER

## 2025-02-28 RX ORDER — AMOXICILLIN AND CLAVULANATE POTASSIUM 875; 125 MG/1; MG/1
1 TABLET, FILM COATED ORAL 2 TIMES DAILY
Qty: 20 TABLET | Refills: 0 | Status: SHIPPED | OUTPATIENT
Start: 2025-02-28 | End: 2025-03-10

## 2025-02-28 NOTE — PROGRESS NOTES
"Junior Silva, a 44 y.o. male was seen today in the clinic for follow up audiologic evaluation. Patient reports previous ear concerns, including hearing loss, had resolved but reoccurred with recent "head cold".      Pure tone testing revealed mild (conductive) hearing loss, bilaterally. Tympanometry revealed Type B tympanograms in both ears with normal ear canal volumes.     Results were reviewed with the patient and the recommendation of a hearing aid consultation was discussed.    Recommendations:  Otologic evaluation  Repeat audiologic evaluation at ENT follow up  Hearing protection in noise              "

## 2025-02-28 NOTE — PROGRESS NOTES
"    Chief Complaint   Patient presents with    Follow-up     Doing better no new issues        HPI 1/31/2025: Junior Silva is a 44 y.o. male who is referred to me by Dr. Ragini Constantino in consultation for possible otitis media . Junior Silva reported many episodes of otitis media in the past that required PE tubes. He felt like he has been having bilateral ear infection since last year due to muffled hearing. He went his PCP on 1/2/2025 and was rx'ed Augmentin bid x 10 days for sinusitis. He reports having sneezing and dizzy spells while he was on Augmentin. Reports spinning sensation for well over 20 minutes after he sneezes. His dizziness has resolved after he completed the Augmentin.   He perceived tinnitus in both ears. 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   He also reports of seasonal allergies with headaches and nasal congestion. He states that he had the allergy test over 10 years ago and was shown to have sensitivity to dust mites. He is currently not on any nasal sprays or antihistamines.     Interval HPI 2/28/2025:  Follow up visit. He reports some improvement with his ear symptoms after starting on Flonase, cetirizine and nasal saline rinses but ear fullness came back after having a cold. He reports having dizziness with spinning sensation only with hard nose blowing. He reports having frequent ear infections and had to have tubes as a child.     Past Medical History:   Diagnosis Date    Allergy     Gastroesophageal reflux disease without esophagitis 10/06/2015    Hyperlipidemia     Mild vitamin D deficiency 11/13/2012    Rectal bleeding     Severe obesity (BMI 35.0-35.9 with comorbidity) 01/14/2019     Social History     Socioeconomic History    Marital status:     Number of children: 1   Tobacco Use    Smoking status: Never    Smokeless tobacco: Never   Substance and Sexual Activity    Alcohol use: Yes    Drug use: No    Sexual activity: Yes     Partners: " Female     Social Drivers of Health     Financial Resource Strain: Low Risk  (11/23/2023)    Overall Financial Resource Strain (CARDIA)     Difficulty of Paying Living Expenses: Not hard at all   Food Insecurity: No Food Insecurity (11/23/2023)    Hunger Vital Sign     Worried About Running Out of Food in the Last Year: Never true     Ran Out of Food in the Last Year: Never true   Transportation Needs: No Transportation Needs (11/23/2023)    PRAPARE - Transportation     Lack of Transportation (Medical): No     Lack of Transportation (Non-Medical): No   Physical Activity: Patient Declined (11/23/2023)    Exercise Vital Sign     Days of Exercise per Week: Patient declined     Minutes of Exercise per Session: Patient declined   Stress: No Stress Concern Present (11/23/2023)    [employer] [place] [employer] - Occupational Stress Questionnaire     Feeling of Stress : Not at all   Housing Stability: Low Risk  (11/23/2023)    Housing Stability Vital Sign     Unable to Pay for Housing in the Last Year: No     Number of Places Lived in the Last Year: 1     Unstable Housing in the Last Year: No     Past Surgical History:   Procedure Laterality Date    COLONOSCOPY N/A 11/12/2015    Procedure: COLONOSCOPY;  Surgeon: Genaro Wright MD;  Location: [address];  Service: Endoscopy;  Laterality: N/A;     Family History   Problem Relation Name Age of Onset    Hyperlipidemia Mother      Hypertension Mother      Cancer Father          pancreatic    No Known Problems Daughter Betty     Cancer Paternal Uncle  84        Colon           Review of Systems  General: negative for chills, fever or weight loss  Psychological: negative for mood changes or depression  Ophthalmic: negative for blurry vision, photophobia or eye pain  ENT: see HPI  Respiratory: no cough, shortness of breath, or wheezing  Cardiovascular: no chest pain or dyspnea on exertion  Gastrointestinal: no abdominal pain, change in bowel habits, or black/  bloody stools  Musculoskeletal: negative for gait disturbance or muscular weakness  Neurological: no syncope or seizures; no ataxia  Dermatological: negative for pruritis,  rash and jaundice  Hematologic/lymphatic: no easy bruising, no new adenopathy      Physical Exam:    Vitals:    02/28/25 1505   BP: (!) 142/91   Pulse: 80         Constitutional: Well appearing / communicating without difficutly.  NAD.  Eyes: EOM I Bilaterally  Head/Face: Normocephalic.  Negative paranasal sinus pressure/tenderness.  Salivary glands WNL.  House Brackmann I Bilaterally.    Right Ear: Auricle normal appearance. External Auditory Canal within normal limits; no lesions or masses,TM w/o masses/lesions/perforations. TM is scarred and erythematous, +auto-insufflation   Left Ear: Auricle normal appearance. External Auditory Canal within normal limits; no lesions or masses,TM w/o masses/lesions/perforations. TM erythematous, +auto-insufflation   Vestibular exam:  negative Right Dixx- Hallpike; negative left Dixx- Hallpike  Nose: No gross nasal septal deviation. Inferior Turbinates 3+ bilaterally. No septal perforation. No masses/lesions. External nasal skin appears normal without masses/lesions.  Oral Cavity: Gingiva/lips within normal limits.  Dentition/gingiva healthy appearing. Mucus membranes moist. Floor of mouth soft, no masses palpated. Oral Tongue mobile. Hard Palate appears normal.    Oropharynx: Base of tongue appears normal. No masses/lesions noted. Tonsillar fossa/pharyngeal wall without lesions. Posterior oropharynx WNL.  Soft palate without masses. Midline uvula.   Neck/Lymphatic: No LAD I-VI bilaterally.  No thyromegaly.  No masses noted on exam.    Mirror laryngoscopy/nasopharyngoscopy: Active gag reflex.  Unable to perform.    Neuro/Psychiatric: AOx3.  Normal mood and affect.   Cardiovascular: Normal carotid pulses bilaterally, no increasing jugular venous distention noted at cervical region bilaterally.    Respiratory:  Normal respiratory effort, no stridor, no retractions noted.      Audiogram interpreted personally by me and discussed in detail with the patient today.   Pure tone testing revealed mild (conductive) hearing loss, bilaterally. Tympanometry revealed Type B tympanograms in both ears with normal ear canal volumes.       Assessment:    ICD-10-CM ICD-9-CM    1. Conductive hearing loss, bilateral  H90.0 389.06 CT Temporal Bone without contrast      2. Eustachian tube dysfunction, bilateral  H69.93 381.81       3. Dizziness  R42 780.4       4. Bilateral otitis media, unspecified otitis media type  H66.93 382.9           The primary encounter diagnosis was Conductive hearing loss, bilateral. Diagnoses of Eustachian tube dysfunction, bilateral, Dizziness, and Bilateral otitis media, unspecified otitis media type were also pertinent to this visit.      Plan:  Orders Placed This Encounter   Procedures    CT Temporal Bone without contrast     -ordered CT temporal  -he would like to wait on VNG until after CT temporal results  -start on Augmentin bid x 10 days  -continue on Flonase and cetirizine  -follow up 2 weeks.       Nidhi Mejia NP      Answers submitted by the patient for this visit:  Review of Symptoms Questionnaire  (Submitted on 2/27/2025)  None of these: Yes  sinus pressure : Yes  Sinus infection(s)?: Yes  sore throat: Yes  Voice Change?: Yes  None of these : Yes  None of these: Yes  None of these : Yes  None of these: Yes  None of these: Yes  None of these: Yes  None of these : Yes  None of these: Yes  None of these : Yes  dizziness: Yes  swollen glands: Yes  None of these: Yes

## 2025-03-17 ENCOUNTER — OFFICE VISIT (OUTPATIENT)
Dept: OTOLARYNGOLOGY | Facility: CLINIC | Age: 45
End: 2025-03-17
Payer: COMMERCIAL

## 2025-03-17 VITALS — WEIGHT: 269.5 LBS | BODY MASS INDEX: 37.59 KG/M2

## 2025-03-17 DIAGNOSIS — H90.0 CONDUCTIVE HEARING LOSS OF BOTH EARS: ICD-10-CM

## 2025-03-17 DIAGNOSIS — H69.93 EUSTACHIAN TUBE DYSFUNCTION, BILATERAL: ICD-10-CM

## 2025-03-17 DIAGNOSIS — H71.90 CHOLESTEATOMA, UNSPECIFIED LATERALITY: ICD-10-CM

## 2025-03-17 DIAGNOSIS — J30.89 NON-SEASONAL ALLERGIC RHINITIS, UNSPECIFIED TRIGGER: ICD-10-CM

## 2025-03-17 DIAGNOSIS — J32.4 CHRONIC PANSINUSITIS: Primary | ICD-10-CM

## 2025-03-17 PROCEDURE — 99214 OFFICE O/P EST MOD 30 MIN: CPT | Mod: S$GLB,,, | Performed by: NURSE PRACTITIONER

## 2025-03-17 PROCEDURE — 99999 PR PBB SHADOW E&M-EST. PATIENT-LVL III: CPT | Mod: PBBFAC,,, | Performed by: NURSE PRACTITIONER

## 2025-03-17 PROCEDURE — 3044F HG A1C LEVEL LT 7.0%: CPT | Mod: CPTII,S$GLB,, | Performed by: NURSE PRACTITIONER

## 2025-03-17 PROCEDURE — 1159F MED LIST DOCD IN RCRD: CPT | Mod: CPTII,S$GLB,, | Performed by: NURSE PRACTITIONER

## 2025-03-17 PROCEDURE — 3008F BODY MASS INDEX DOCD: CPT | Mod: CPTII,S$GLB,, | Performed by: NURSE PRACTITIONER

## 2025-03-17 RX ORDER — AZELASTINE 1 MG/ML
1 SPRAY, METERED NASAL 2 TIMES DAILY
Qty: 30 ML | Refills: 11 | Status: SHIPPED | OUTPATIENT
Start: 2025-03-17 | End: 2026-03-17

## 2025-03-17 NOTE — PROGRESS NOTES
"    Chief Complaint   Patient presents with    Follow-up     Bilateral otitis. Dizziness resolved.       HPI 1/31/2025: Junior Silva is a 45 y.o. male who is referred to me by Dr. Ragini Constantino in consultation for possible otitis media . Junior Silva reported many episodes of otitis media in the past that required PE tubes. He felt like he has been having bilateral ear infection since last year due to muffled hearing. He went his PCP on 1/2/2025 and was rx'ed Augmentin bid x 10 days for sinusitis. He reports having sneezing and dizzy spells while he was on Augmentin. Reports spinning sensation for well over 20 minutes after he sneezes. His dizziness has resolved after he completed the Augmentin.   He perceived tinnitus in both ears. 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   He also reports of seasonal allergies with headaches and nasal congestion. He states that he had the allergy test over 10 years ago and was shown to have sensitivity to dust mites. He is currently not on any nasal sprays or antihistamines.     Interval HPI 3/17/2025:  Follow up visit. He reports sinus and ear symptoms improved. He is no longer having dizziness. He states that his sinus infection improved after taking Augmentin and Medrol Dosepak. He has been using the nasal saline rinses daily along with taking cetirizine and using Flonase. CT temporal showed possible cholesteatoma of the right the epitympanum. CT also showed chronic sinusitis.     Past Medical History:   Diagnosis Date    Allergy     Gastroesophageal reflux disease without esophagitis 10/06/2015    Hyperlipidemia     Mild vitamin D deficiency 11/13/2012    Rectal bleeding     Severe obesity (BMI 35.0-35.9 with comorbidity) 01/14/2019     Social History     Socioeconomic History    Marital status:     Number of children: 1   Tobacco Use    Smoking status: Never    Smokeless tobacco: Never   Substance and Sexual Activity    Alcohol use: " Yes    Drug use: No    Sexual activity: Yes     Partners: Female     Social Drivers of Health     Financial Resource Strain: Low Risk  (11/23/2023)    Overall Financial Resource Strain (CARDIA)     Difficulty of Paying Living Expenses: Not hard at all   Food Insecurity: No Food Insecurity (11/23/2023)    Hunger Vital Sign     Worried About Running Out of Food in the Last Year: Never true     Ran Out of Food in the Last Year: Never true   Transportation Needs: No Transportation Needs (11/23/2023)    PRAPARE - Transportation     Lack of Transportation (Medical): No     Lack of Transportation (Non-Medical): No   Physical Activity: Patient Declined (11/23/2023)    Exercise Vital Sign     Days of Exercise per Week: Patient declined     Minutes of Exercise per Session: Patient declined   Stress: No Stress Concern Present (11/23/2023)    [employer] [place] [employer] - Occupational Stress Questionnaire     Feeling of Stress : Not at all   Housing Stability: Low Risk  (11/23/2023)    Housing Stability Vital Sign     Unable to Pay for Housing in the Last Year: No     Number of Places Lived in the Last Year: 1     Unstable Housing in the Last Year: No     Past Surgical History:   Procedure Laterality Date    COLONOSCOPY N/A 11/12/2015    Procedure: COLONOSCOPY;  Surgeon: Genaro Wright MD;  Location: [address]);  Service: Endoscopy;  Laterality: N/A;     Family History   Problem Relation Name Age of Onset    Hyperlipidemia Mother      Hypertension Mother      Cancer Father          pancreatic    No Known Problems Daughter Betty     Cancer Paternal Uncle  84        Colon           Review of Systems  General: negative for chills, fever or weight loss  Psychological: negative for mood changes or depression  Ophthalmic: negative for blurry vision, photophobia or eye pain  ENT: see HPI  Respiratory: no cough, shortness of breath, or wheezing  Cardiovascular: no chest pain or dyspnea on  exertion  Gastrointestinal: no abdominal pain, change in bowel habits, or black/ bloody stools  Musculoskeletal: negative for gait disturbance or muscular weakness  Neurological: no syncope or seizures; no ataxia  Dermatological: negative for pruritis,  rash and jaundice  Hematologic/lymphatic: no easy bruising, no new adenopathy      Physical Exam:    There were no vitals filed for this visit.      Constitutional: Well appearing / communicating without difficutly.  NAD.  Eyes: EOM I Bilaterally  Head/Face: Normocephalic.  Negative paranasal sinus pressure/tenderness.  Salivary glands WNL.  House Brackmann I Bilaterally.    Right Ear: Auricle normal appearance. External Auditory Canal within normal limits; no lesions or masses,TM w/o masses/lesions/perforations. TM is scarred and retracted, +auto-insufflation   Left Ear: Auricle normal appearance. External Auditory Canal within normal limits; no lesions or masses,TM w/o masses/lesions/perforations. TM is scarred and retracted, +auto-insufflation   Vestibular exam:  negative Right Dixx- Hallpike; negative left Dixx- Hallpike  Nose: No gross nasal septal deviation. Inferior Turbinates 3+ bilaterally. No septal perforation. No masses/lesions. External nasal skin appears normal without masses/lesions.  Oral Cavity: Gingiva/lips within normal limits.  Dentition/gingiva healthy appearing. Mucus membranes moist. Floor of mouth soft, no masses palpated. Oral Tongue mobile. Hard Palate appears normal.    Oropharynx: Base of tongue appears normal. No masses/lesions noted. Tonsillar fossa/pharyngeal wall without lesions. Posterior oropharynx WNL.  Soft palate without masses. Midline uvula.   Neck/Lymphatic: No LAD I-VI bilaterally.  No thyromegaly.  No masses noted on exam.    Mirror laryngoscopy/nasopharyngoscopy: Active gag reflex.  Unable to perform.    Neuro/Psychiatric: AOx3.  Normal mood and affect.   Cardiovascular: Normal carotid pulses bilaterally, no increasing  jugular venous distention noted at cervical region bilaterally.    Respiratory: Normal respiratory effort, no stridor, no retractions noted.        Pure tone testing revealed mild conductive loss in both low and high frequencies, bilaterally. Speech reception thresholds were obtained at 15 dBHL in the right ear and 15 dBHL in the left ear. Speech discrimination scores were 100% in the right ear and 92% in the left ear. Tympanometry revealed Type B tympanograms in both ears.       CT TEMPORAL BONE WITHOUT CONTRAST 3/6/2025     CLINICAL HISTORY:  Hearing loss.     COMPARISON: None.     TECHNIQUE: Standard noncontrast CT scan of the temporal bone with thin section axial and oblique sagittal and coronal reformats. All CT scans at [this location] are performed using dose modulation techniques as appropriate to a performed exam including the following: automated exposure control; adjustment of the mA and/or kV according to patient size (this includes techniques or standardized protocols for targeted exams where dose is matched to indication / reason for exam; i.e. extremities or head); use of iterative reconstruction technique.     FINDINGS: The left external auditory canal is patent. There is thickening of the left tympanic membrane with retraction. There are linear bands of soft tissue thickening abutting the medial surface of the tympanic membrane extending into the left mesotympanum and epitympanum.     The left ear ossicles appear normal.     The left mastoid is hyperpneumatized. Normal aeration is noted. The left inner ear structures are normal. Minor pneumatization of the left petrous apex air cells is seen.     The right external auditory canal is patent. There is also thickening of the right tympanic membrane with retraction. There is abnormal soft tissue thickening in the epitympanum surrounding the right middle ear ear ossicles. No definite bone destruction is seen. The scutum is relatively well-defined.  Nonetheless findings are concerning for an epitympanic cholesteatoma the right middle ear.     The right inner ear structures appear normal. The right mastoid is normally pneumatized with soft tissue opacification of the aditus and partially of the antrum of the temporal bone. The IAC appears grossly normal bilaterally.     Incidentally there is mucosal thickening in the sphenoid sinus cavities.        Impression:   Left tympanic membrane thickening with retraction and linear opacities in the medial ear suggestive of chronic sequela of otitis media.     Right tympanic membrane thickening with retraction as well as soft tissue opacification of the epitympanum concerning for cholesteatoma.       Assessment:    ICD-10-CM ICD-9-CM    1. Chronic pansinusitis  J32.4 473.8 Ambulatory referral/consult to ENT      CANCELED: Ambulatory referral/consult to ENT      2. Eustachian tube dysfunction, bilateral  H69.93 381.81 Ambulatory referral/consult to ENT      3. Conductive hearing loss of both ears  H90.0 389.06 Ambulatory referral/consult to ENT      4. Cholesteatoma, unspecified laterality  H71.90 385.30 Ambulatory referral/consult to ENT      5. Non-seasonal allergic rhinitis, unspecified trigger  J30.89 477.8           The primary encounter diagnosis was Chronic pansinusitis. Diagnoses of Eustachian tube dysfunction, bilateral, Conductive hearing loss of both ears, Cholesteatoma, unspecified laterality, and Non-seasonal allergic rhinitis, unspecified trigger were also pertinent to this visit.      Plan:  Orders Placed This Encounter   Procedures    Ambulatory referral/consult to ENT    Ambulatory referral/consult to ENT     -referral to neurotology for possible cholesteatoma  -start on Azelastine 1 spray in each nostril bid  -continue on nasal saline rinses, flonase and cetirizine daily.   -referral to Dr. Ross for chronic sinusitis    Nidhi Mejia NP      Answers submitted by the patient for this visit:  Review of  Symptoms Questionnaire  (Submitted on 3/13/2025)  None of these: Yes  None of these : Yes  None of these: Yes  None of these : Yes  None of these: Yes  None of these: Yes  None of these: Yes  None of these : Yes  None of these: Yes  None of these : Yes  None of these: Yes  None of these: Yes  None of these: Yes

## 2025-03-25 ENCOUNTER — OFFICE VISIT (OUTPATIENT)
Dept: OTOLARYNGOLOGY | Facility: CLINIC | Age: 45
End: 2025-03-25
Payer: COMMERCIAL

## 2025-03-25 DIAGNOSIS — H90.0 CONDUCTIVE HEARING LOSS OF BOTH EARS: Primary | ICD-10-CM

## 2025-03-25 DIAGNOSIS — Q16.5 TEGMEN DEFECT OF BASE OF SKULL: ICD-10-CM

## 2025-03-25 PROCEDURE — 99999 PR PBB SHADOW E&M-EST. PATIENT-LVL II: CPT | Mod: PBBFAC,,, | Performed by: OTOLARYNGOLOGY

## 2025-03-25 NOTE — PROGRESS NOTES
Subjective:   Junior Silva is a 45 y.o. male who referred by KAYLENE Mejia for ear evaluation. Recently presented for sinus infection and muffled hearing. CT temporal ordered and noted to have right middle ear mass. Patient reports a mild progressive hearing loss in both ears over the last year (L>R). Occasionally feels as if he is under water. He notes tinnitus bilaterally for numerous years. + history of ear infections in childhood. PET at 5 years old. Denies otalgia or otorrhea. He reports a few episodes of dizziness over the last month with sneezing. He describes a room spinning sensation that will last for several minutes. Not worse with positional changes.       Past Medical History  He has a past medical history of Allergy, Gastroesophageal reflux disease without esophagitis, Hyperlipidemia, Mild vitamin D deficiency, Rectal bleeding, and Severe obesity (BMI 35.0-35.9 with comorbidity).    Past Surgical History  He has a past surgical history that includes Colonoscopy (N/A, 11/12/2015).    Family History  His family history includes Cancer in his father; Cancer (age of onset: 84) in his paternal uncle; Hyperlipidemia in his mother; Hypertension in his mother; No Known Problems in his daughter.    Social History  He reports that he has never smoked. He has never used smokeless tobacco. He reports current alcohol use. He reports that he does not use drugs.    Allergies  He is allergic to paba plus sunscreen and benadryl itch relief.    Medications  He has a current medication list which includes the following prescription(s): azelastine, cetirizine, and fluticasone propionate.    Review of Systems     Constitutional: Negative for appetite change, chills, fatigue, fever and unexpected weight loss.      HENT: Positive for hearing loss and ringing in the ears.  Negative for ear discharge, ear infection and ear pain.      Eyes:  Negative for change in eyesight, eye drainage, eye itching and photophobia.  "    Respiratory:  Negative for cough, shortness of breath, sleep apnea, snoring and wheezing.      Cardiovascular:  Negative for chest pain, foot swelling, irregular heartbeat and swollen veins.     Gastrointestinal:  Negative for abdominal pain, acid reflux, constipation, diarrhea, heartburn and vomiting.     Genitourinary: Negative for difficulty urinating, sexual problems and frequent urination.     Musc: Negative for aching joints, aching muscles, back pain and neck pain.     Skin: Negative for rash.     Allergy: Positive for seasonal allergies.     Endocrine: Negative for cold intolerance and heat intolerance.      Neurological: Positive for dizziness.     Hematologic: Negative for bruises/bleeds easily and swollen glands.      Psychiatric: Negative for decreased concentration, depression, nervous/anxious and sleep disturbance.          Objective:       Head:  Normocephalic and atraumatic.     Ears:    Right Ear: No drainage, swelling or tenderness. Tympanic membrane is scarred and retracted. Tympanic membrane is not perforated and not erythematous. No middle ear effusion.   Left Ear: No drainage, swelling or tenderness. Tympanic membrane is injected, scarred and retracted. Tympanic membrane is not perforated and not erythematous.  No middle ear effusion.   No evidence of cholesteatoma    Pulmonary/Chest:   Effort normal.     Procedure  None    Audiology       Audiogram reviewed.       Imaging:   CT temporal bone 3/3/25 reviewed.   Impression:  Left tympanic membrane thickening with retraction and linear opacities in the medial ear suggestive of chronic sequela of otitis media.     Right tympanic membrane thickening with retraction as well as soft tissue opacification of the epitympanum concerning for cholesteatoma.    Assessment:     1. Conductive hearing loss of both ears    2. Tegmen defect of base of skull      Plan:   Junior Holguin" was seen today for referral.  Diagnoses and all orders for jermaine " visit:    Conductive hearing loss of both ears  Tegmen defect of base of skull  -     MRI IAC/Temporal Bones W W/O Contrast; Future    In summary, 45 y.o. male who presents for evaluation of a right middle ear mass found on recent CT temporal bone. MRI IAC/temporal bone ordered for further evaluation and differentiation. Rule out encephalocele. Follow up with MRI results.

## 2025-04-13 ENCOUNTER — HOSPITAL ENCOUNTER (OUTPATIENT)
Dept: RADIOLOGY | Facility: HOSPITAL | Age: 45
Discharge: HOME | End: 2025-04-13
Attending: OTOLARYNGOLOGY
Payer: COMMERCIAL

## 2025-04-13 DIAGNOSIS — Q16.5 TEGMEN DEFECT OF BASE OF SKULL: ICD-10-CM

## 2025-04-13 DIAGNOSIS — H90.0 CONDUCTIVE HEARING LOSS OF BOTH EARS: ICD-10-CM

## 2025-04-13 PROCEDURE — 25500020 PHARM REV CODE 255: Performed by: OTOLARYNGOLOGY

## 2025-04-13 PROCEDURE — 70553 MRI BRAIN STEM W/O & W/DYE: CPT | Mod: TC

## 2025-04-13 PROCEDURE — 70553 MRI BRAIN STEM W/O & W/DYE: CPT | Mod: 26,,, | Performed by: RADIOLOGY

## 2025-04-13 PROCEDURE — A9585 GADOBUTROL INJECTION: HCPCS | Performed by: OTOLARYNGOLOGY

## 2025-04-13 RX ORDER — GADOBUTROL 604.72 MG/ML
10 INJECTION INTRAVENOUS
Status: COMPLETED | OUTPATIENT
Start: 2025-04-13 | End: 2025-04-13

## 2025-04-13 RX ADMIN — GADOBUTROL 10 ML: 604.72 INJECTION INTRAVENOUS at 09:04

## 2025-04-15 ENCOUNTER — OFFICE VISIT (OUTPATIENT)
Dept: OTOLARYNGOLOGY | Facility: CLINIC | Age: 45
End: 2025-04-15
Payer: COMMERCIAL

## 2025-04-15 DIAGNOSIS — H90.0 CONDUCTIVE HEARING LOSS OF BOTH EARS: ICD-10-CM

## 2025-04-15 DIAGNOSIS — Q16.5 TEGMEN DEFECT OF BASE OF SKULL: Primary | ICD-10-CM

## 2025-04-15 PROCEDURE — 3044F HG A1C LEVEL LT 7.0%: CPT | Mod: CPTII,S$GLB,, | Performed by: OTOLARYNGOLOGY

## 2025-04-15 PROCEDURE — 69433 CREATE EARDRUM OPENING: CPT | Mod: LT,S$GLB,, | Performed by: OTOLARYNGOLOGY

## 2025-04-15 PROCEDURE — 99213 OFFICE O/P EST LOW 20 MIN: CPT | Mod: 25,S$GLB,, | Performed by: OTOLARYNGOLOGY

## 2025-04-16 NOTE — PROGRESS NOTES
Subjective:   Junior Silva is a 45 y.o. male who referred by KAYLENE Mejia for ear evaluation. Recently presented for sinus infection and muffled hearing. CT temporal ordered and noted to have right middle ear mass. Patient reports a mild progressive hearing loss in both ears over the last year (L>R). Occasionally feels as if he is under water. He notes tinnitus bilaterally for numerous years. + history of ear infections in childhood. PET at 5 years old. Denies otalgia or otorrhea. He reports a few episodes of dizziness over the last month with sneezing. He describes a room spinning sensation that will last for several minutes. Not worse with positional changes.     04/15/2025:     Patient is here today for follow-up after MRI.  He still reports left-sided clogged sensation in his ear.    Past Medical History  He has a past medical history of Allergy, Gastroesophageal reflux disease without esophagitis, Hyperlipidemia, Mild vitamin D deficiency, Rectal bleeding, and Severe obesity (BMI 35.0-35.9 with comorbidity).    Past Surgical History  He has a past surgical history that includes Colonoscopy (N/A, 11/12/2015).    Family History  His family history includes Cancer in his father; Cancer (age of onset: 84) in his paternal uncle; Hyperlipidemia in his mother; Hypertension in his mother; No Known Problems in his daughter.    Social History  He reports that he has never smoked. He has never used smokeless tobacco. He reports current alcohol use. He reports that he does not use drugs.    Allergies  He is allergic to paba plus sunscreen and benadryl itch relief.    Medications  He has a current medication list which includes the following prescription(s): azelastine, cetirizine, and fluticasone propionate.    Review of Systems     Constitutional: Negative for appetite change, chills, fatigue, fever and unexpected weight loss.      HENT: Positive for hearing loss and ringing in the ears.  Negative for ear discharge, ear  infection and ear pain.      Eyes:  Negative for change in eyesight, eye drainage, eye itching and photophobia.     Respiratory:  Negative for cough, shortness of breath, sleep apnea, snoring and wheezing.      Cardiovascular:  Negative for chest pain, foot swelling, irregular heartbeat and swollen veins.     Gastrointestinal:  Negative for abdominal pain, acid reflux, constipation, diarrhea, heartburn and vomiting.     Genitourinary: Negative for difficulty urinating, sexual problems and frequent urination.     Musc: Negative for aching joints, aching muscles, back pain and neck pain.     Skin: Negative for rash.     Allergy: Positive for seasonal allergies.     Endocrine: Negative for cold intolerance and heat intolerance.      Neurological: Positive for dizziness.     Hematologic: Negative for bruises/bleeds easily and swollen glands.      Psychiatric: Negative for decreased concentration, depression, nervous/anxious and sleep disturbance.          Objective:       Head:  Normocephalic and atraumatic.     Ears:    Right Ear: No drainage, swelling or tenderness. Tympanic membrane is scarred and retracted. Tympanic membrane is not perforated and not erythematous. No middle ear effusion.   Left Ear: No drainage, swelling or tenderness. Tympanic membrane is injected, scarred and retracted. Tympanic membrane is not perforated and not erythematous.  No middle ear effusion.     Pulmonary/Chest:   Effort normal.     Procedure  None    Audiology       Audiogram reviewed.       Imaging:   CT temporal bone 3/3/25 reviewed.   Impression:  Left tympanic membrane thickening with retraction and linear opacities in the medial ear suggestive of chronic sequela of otitis media.     Right tympanic membrane thickening with retraction as well as soft tissue opacification of the epitympanum concerning for cholesteatoma.    MRI independently reviewed by me showsImpression:     Mri Iac:     Trace fluid opacification right mastoid air  cells.  Otherwise unremarkable Mri IACs as detailed above specifically without evidence for diffusion positive material within the right middle ear cavity to suggest definite cholesteatoma.  Findings seen on CT may be sequela of prior middle ear fluid with interval resolution.  Proteinaceous material may be below MRI resolution and correlation with direct visual inspection and follow-up CT advised.     MRI brain: Otherwise unremarkable MRI brain specifically without evidence for acute infarction or intracranial enhancing lesion..      Procedure: Left myringotomy with PE tube placement   Details:After informed consent regarding infection, perforation, early extrusion and possible cholesteatoma formation the patient was brought to the minor procedure room and placed in the supine position. The operating microscope was then brought onto the field and the tympanic membrane was visualized. Using a sterile, single use phenol kit the TM was sterilized and anesthetized. A myringotomy incision was made and there was no effusion noted.  A sterile Leo V tympanostomy tube was placed and the procedure was terminated. The patient tolerated the procedure well.      Water precautions discussed. RTC as directed.      Assessment:     1. Tegmen defect of base of skull    2. Conductive hearing loss of both ears      Plan:       In summary is a 45-year-old with complaints of left chronic hearing loss after a bad sinus infection.  CT and MRI show soft tissue opacification in the epitympanum area.  It is hard to determine if there is not a small encephalocele present.  Myringotomy was performed today to try to obtain beta 2 testing however not enough fluid was present.  PE tube was placed to monitor patient     Follow up in one-month with audiogram

## 2025-04-22 ENCOUNTER — OFFICE VISIT (OUTPATIENT)
Dept: OTOLARYNGOLOGY | Facility: CLINIC | Age: 45
End: 2025-04-22
Payer: COMMERCIAL

## 2025-04-22 VITALS
DIASTOLIC BLOOD PRESSURE: 84 MMHG | SYSTOLIC BLOOD PRESSURE: 130 MMHG | BODY MASS INDEX: 37.91 KG/M2 | WEIGHT: 271.81 LBS | HEART RATE: 72 BPM

## 2025-04-22 DIAGNOSIS — J32.4 CHRONIC PANSINUSITIS: Primary | Chronic | ICD-10-CM

## 2025-04-22 DIAGNOSIS — H69.93 DYSFUNCTION OF BOTH EUSTACHIAN TUBES: ICD-10-CM

## 2025-04-22 DIAGNOSIS — J34.3 HYPERTROPHY OF BOTH INFERIOR NASAL TURBINATES: Chronic | ICD-10-CM

## 2025-04-22 DIAGNOSIS — J34.829 NASAL VALVE COLLAPSE: ICD-10-CM

## 2025-04-22 DIAGNOSIS — J30.89 NON-SEASONAL ALLERGIC RHINITIS, UNSPECIFIED TRIGGER: ICD-10-CM

## 2025-04-22 DIAGNOSIS — J34.2 DEVIATED NASAL SEPTUM: Chronic | ICD-10-CM

## 2025-04-22 PROCEDURE — 99999 PR PBB SHADOW E&M-EST. PATIENT-LVL III: CPT | Mod: PBBFAC,,, | Performed by: OTOLARYNGOLOGY

## 2025-04-22 RX ORDER — DOXYCYCLINE HYCLATE 100 MG
100 TABLET ORAL 2 TIMES DAILY
Qty: 42 TABLET | Refills: 0 | Status: SHIPPED | OUTPATIENT
Start: 2025-04-22 | End: 2025-05-13

## 2025-04-22 NOTE — PATIENT INSTRUCTIONS
Continue nasal sprays and zyrtec.  Continue nasal saline rinses.     The patient will complete a course of antibiotics and medical management for the sinusitis.  CT scan will be obtained after treatment in 3-4 weeks.  I will communicate these results when they are available.  The patient will follow up for further discussion of the symptoms and management.     How do you use a Nasal Corticosteroid Spray?    Make sure you understand your dosing instructions. Spray only the number of prescribed sprays in each nostril. Read the package instructions before using your spray the first time.    Most corticosteroid sprays suggest the following steps:    Wash your hands well.    Gently blow your nose to clear the passageway.    Shake the container several times.    Tilt your head slightly downward.  Use the opposite hand from the nostril you will be spraying to hold the spray bottle.    Block one nostril with your finger.  Insert the nasal applicator into the other nostril.    Aim the spray toward the outer wall of the nostril.  Inhale slowly through the nose and press the .    Breathe out and repeat to apply the prescribed number of sprays.  Repeat these steps for the other nostril.     Avoid sneezing or blowing your nose right after spraying.         Cell Phone/PDA (specify)/Clothing

## 2025-04-22 NOTE — PROGRESS NOTES
"    Chief Complaint   Patient presents with    Nasal Congestion     Left side, some facial pressure     Per Nidhi Mejia NP:  HPI 1/31/2025: Junior Silva is a 45 y.o. male who is referred to me by Dr. Ragini Constantino in consultation for possible otitis media . Junior Silva reported many episodes of otitis media in the past that required PE tubes. He felt like he has been having bilateral ear infection since last year due to muffled hearing. He went his PCP on 1/2/2025 and was rx'ed Augmentin bid x 10 days for sinusitis. He reports having sneezing and dizzy spells while he was on Augmentin. Reports spinning sensation for well over 20 minutes after he sneezes. His dizziness has resolved after he completed the Augmentin.   He perceived tinnitus in both ears. 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   He also reports of seasonal allergies with headaches and nasal congestion. He states that he had the allergy test over 10 years ago and was shown to have sensitivity to dust mites. He is currently not on any nasal sprays or antihistamines.     Interval HPI 3/17/2025:  Follow up visit. He reports sinus and ear symptoms improved. He is no longer having dizziness. He states that his sinus infection improved after taking Augmentin and Medrol Dosepak. He has been using the nasal saline rinses daily along with taking cetirizine and using Flonase. CT temporal showed possible cholesteatoma of the right the epitympanum. CT also showed chronic sinusitis.       Per Dr. Ross:  Interval HPI 04/22/2025 :      Longstanding history of nasal congestion, especially left side.  He does not breathe well through the nose and constantly mouth breathes.  Exercises difficult for this reason.    He has a longstanding history of allergic rhinitis, has had allergy testing in the distant past.  He has significant family history of allergic rhinitis and asthma.  There is no known trauma to the nose.  Sibling had " congenital septal issues as well.  Patient had 2 recent rounds of Augmentin but those were subsequent to CT scan.  Related to ear issues.   He does feel the Flonase, azelastine, and Zyrtec he has been on for the last 2 months are helping his symptoms.  Patient saw Dr. Villalta recently for the left-sided middle ear mass.  Question of cholesteatoma versus encephalocele.  PE tube placed in the left ear by Dr. Villalta and at this time they are continuing to monitor and there has not been discussion of surgical intervention.  Allergens; +old house/?mold, +cats dogs, no carpeting, no smoking exp    CT TEMPORAL BONE WITHOUT CONTRAST- reviewed today with the patient- agree with below, also noted significant septal deviation to left and evidence of sinusitis of maxillary, sphenoid, ethmoid sinuses on left.      CLINICAL HISTORY:  Hearing loss.     COMPARISON: None.     TECHNIQUE: Standard noncontrast CT scan of the temporal bone with thin section axial and oblique sagittal and coronal reformats. All CT scans at [this location] are performed using dose modulation techniques as appropriate to a performed exam including the following: automated exposure control; adjustment of the mA and/or kV according to patient size (this includes techniques or standardized protocols for targeted exams where dose is matched to indication / reason for exam; i.e. extremities or head); use of iterative reconstruction technique.     FINDINGS: The left external auditory canal is patent. There is thickening of the left tympanic membrane with retraction. There are linear bands of soft tissue thickening abutting the medial surface of the tympanic membrane extending into the left mesotympanum and epitympanum.     The left ear ossicles appear normal.     The left mastoid is hyperpneumatized. Normal aeration is noted. The left inner ear structures are normal. Minor pneumatization of the left petrous apex air cells is seen.     The right external auditory  canal is patent. There is also thickening of the right tympanic membrane with retraction. There is abnormal soft tissue thickening in the epitympanum surrounding the right middle ear ear ossicles. No definite bone destruction is seen. The scutum is relatively well-defined. Nonetheless findings are concerning for an epitympanic cholesteatoma the right middle ear.     The right inner ear structures appear normal. The right mastoid is normally pneumatized with soft tissue opacification of the aditus and partially of the antrum of the temporal bone. The IAC appears grossly normal bilaterally.     Incidentally there is mucosal thickening in the sphenoid sinus cavities.        Impression:   Left tympanic membrane thickening with retraction and linear opacities in the medial ear suggestive of chronic sequela of otitis media.     Right tympanic membrane thickening with retraction as well as soft tissue opacification of the epitympanum concerning for cholesteatoma.     Past Medical History:   Diagnosis Date    Allergy     Gastroesophageal reflux disease without esophagitis 10/06/2015    Hyperlipidemia     Mild vitamin D deficiency 11/13/2012    Rectal bleeding     Severe obesity (BMI 35.0-35.9 with comorbidity) 01/14/2019     Social History     Socioeconomic History    Marital status:     Number of children: 1   Tobacco Use    Smoking status: Never    Smokeless tobacco: Never   Substance and Sexual Activity    Alcohol use: Yes    Drug use: No    Sexual activity: Yes     Partners: Female     Social Drivers of Health     Financial Resource Strain: Low Risk  (11/23/2023)    Overall Financial Resource Strain (CARDIA)     Difficulty of Paying Living Expenses: Not hard at all   Food Insecurity: No Food Insecurity (11/23/2023)    Hunger Vital Sign     Worried About Running Out of Food in the Last Year: Never true     Ran Out of Food in the Last Year: Never true   Transportation Needs: No Transportation Needs (11/23/2023)     PRAPARE - Transportation     Lack of Transportation (Medical): No     Lack of Transportation (Non-Medical): No   Physical Activity: Patient Declined (11/23/2023)    Exercise Vital Sign     Days of Exercise per Week: Patient declined     Minutes of Exercise per Session: Patient declined   Stress: No Stress Concern Present (11/23/2023)    [employer] [place] [employer] - Occupational Stress Questionnaire     Feeling of Stress : Not at all   Housing Stability: Low Risk  (11/23/2023)    Housing Stability Vital Sign     Unable to Pay for Housing in the Last Year: No     Number of Places Lived in the Last Year: 1     Unstable Housing in the Last Year: No     Past Surgical History:   Procedure Laterality Date    COLONOSCOPY N/A 11/12/2015    Procedure: COLONOSCOPY;  Surgeon: Genaro Wright MD;  Location: [address]);  Service: Endoscopy;  Laterality: N/A;     Family History   Problem Relation Name Age of Onset    Hyperlipidemia Mother      Hypertension Mother      Cancer Father          pancreatic    No Known Problems Daughter Betty     Cancer Paternal Uncle  84        Colon           Review of Systems  General: negative for chills, fever or weight loss  Psychological: negative for mood changes or depression  Ophthalmic: negative for blurry vision, photophobia or eye pain  ENT: see HPI  Respiratory: no cough, shortness of breath, or wheezing  Cardiovascular: no chest pain or dyspnea on exertion  Gastrointestinal: no abdominal pain, change in bowel habits, or black/ bloody stools  Musculoskeletal: negative for gait disturbance or muscular weakness  Neurological: no syncope or seizures; no ataxia  Dermatological: negative for pruritis,  rash and jaundice  Hematologic/lymphatic: no easy bruising, no new adenopathy      Physical Exam:    Vitals:    04/22/25 0812   BP: 130/84   Pulse: 72         Constitutional: Well appearing / communicating without difficutly.  NAD.  Overweight.  Eyes: EOM I  Bilaterally  Head/Face: Normocephalic.  Negative paranasal sinus pressure/tenderness.  Salivary glands WNL.  House Brackmann I Bilaterally.    Right Ear: Auricle normal appearance. External Auditory Canal within normal limits; no lesions or masses,TM w/o masses/lesions/perforations. TM is scarred and retracted, +auto-insufflation   Left Ear: Auricle normal appearance. External Auditory Canal within normal limits; no lesions or masses,TM w/o masses/lesions/perforations. TM is scarred PE tube in place and patent.  No otorrhea or effusion.   Nose:  Significant septal deviation to left.  Nasal dorsum deviated to right, significant narrowing of naris on left due to septal and dorsal deviation.  Nasal valve collapse noted left.  Inferior Turbinates 3+ bilaterally. No septal perforation. No masses/lesions. External nasal skin appears normal without masses/lesions.  Oral Cavity: Gingiva/lips within normal limits.  Dentition/gingiva healthy appearing. Mucus membranes moist. Floor of mouth soft, no masses palpated. Oral Tongue mobile. Hard Palate appears normal.    Oropharynx: Base of tongue appears normal. No masses/lesions noted. Tonsillar fossa/pharyngeal wall without lesions. Posterior oropharynx WNL.  Soft palate without masses. Midline uvula.   Neck/Lymphatic: No LAD I-VI bilaterally.  No thyromegaly.  No masses noted on exam.    Mirror laryngoscopy/nasopharyngoscopy: Active gag reflex.  Unable to perform.    Neuro/Psychiatric: AOx3.  Normal mood and affect.   Cardiovascular: Normal carotid pulses bilaterally, no increasing jugular venous distention noted at cervical region bilaterally.    Respiratory: Normal respiratory effort, no stridor, no retractions noted.    Nasal/sinus endoscopy    Date/Time: 4/22/2025 8:20 AM    Performed by: Prema Ross MD  Authorized by: Prema Ross MD    Consent Done?:  Yes (Verbal)  Anesthesia:     Local anesthetic:  Topical anesthetic    Location: bilateral.    Patient tolerance:   Patient tolerated the procedure well with no immediate complications  Nose:     Procedure Performed:  Nasal Endoscopy  External:      No external nasal deformity  Intranasal:      Mucosa ulcers not present     Enlarged turbinates     Septum gross deformity (significant septal deviation to left, narrowing middle meatus and abutting inferior turbinate)  Nasopharynx:      No mucosa lesions     Adenoids present     Posterior choanae patent     Eustachian tube patent     Sphenoethmoid recess clear on right, left not easily visible due to septum  No purulent drainage or crusting noted from right maxillary sinus ostium        Previous audiogram:         Assessment:    ICD-10-CM ICD-9-CM    1. Chronic pansinusitis  J32.4 473.8 Ambulatory referral/consult to ENT      doxycycline (VIBRA-TABS) 100 MG tablet      CT Medtronic Sinuses without      2. Deviated nasal septum  J34.2 470       3. Hypertrophy of both inferior nasal turbinates  J34.3 478.0       4. Nasal valve collapse  J34.829 738.0       5. Non-seasonal allergic rhinitis, unspecified trigger  J30.89 477.8       6. Dysfunction of both eustachian tubes  H69.93 381.81           The primary encounter diagnosis was Chronic pansinusitis. Diagnoses of Deviated nasal septum, Hypertrophy of both inferior nasal turbinates, Nasal valve collapse, Non-seasonal allergic rhinitis, unspecified trigger, and Dysfunction of both eustachian tubes were also pertinent to this visit.      Plan:  Orders Placed This Encounter   Procedures    CT Medtronic Sinuses without    Nasal/sinus endoscopy     The patient will complete a course of antibiotics and medical management for the sinusitis.  CT scan will be obtained after treatment in 3-4 weeks.  I will communicate these results when they are available.  The patient will follow up for further discussion of the symptoms and management.     Continue nasal sprays and allergy medications.     Follow up after CT and after next visit with   Master.     Prema Ross MD

## 2025-05-13 ENCOUNTER — HOSPITAL ENCOUNTER (OUTPATIENT)
Dept: RADIOLOGY | Facility: HOSPITAL | Age: 45
Discharge: HOME | End: 2025-05-13
Attending: OTOLARYNGOLOGY
Payer: COMMERCIAL

## 2025-05-13 DIAGNOSIS — J32.4 CHRONIC PANSINUSITIS: Chronic | ICD-10-CM

## 2025-05-13 PROCEDURE — 70486 CT MAXILLOFACIAL W/O DYE: CPT | Mod: TC

## 2025-05-13 PROCEDURE — 70486 CT MAXILLOFACIAL W/O DYE: CPT | Mod: 26,,, | Performed by: RADIOLOGY

## 2025-05-15 ENCOUNTER — RESULTS FOLLOW-UP (OUTPATIENT)
Dept: OTOLARYNGOLOGY | Facility: CLINIC | Age: 45
End: 2025-05-15

## 2025-05-15 DIAGNOSIS — J34.829 NASAL VALVE COLLAPSE: ICD-10-CM

## 2025-05-15 DIAGNOSIS — J34.2 DEVIATED NASAL SEPTUM: Primary | ICD-10-CM

## 2025-05-15 NOTE — PROGRESS NOTES
CT scan films reviewed and show significant deviation of the septum to the left with small cristóbal bullosa on the right.  Scant amount of mucosal thickening ethmoid sinuses.  There is also bony deviation of the nasal dorsum.  Given the severity and location of deviation, I feel he may be better served with open septorhinoplasty, as this would allow for best addressing of his severe septal issues, and he could also address the nasal valve collapse that we discussed at his last visit.  If you would like referral, I can place a referral to Dr. Schneider for evaluation of his nasal/septal issues.      Prema Ross MD  [facility]

## 2025-06-03 ENCOUNTER — OFFICE VISIT (OUTPATIENT)
Dept: OTOLARYNGOLOGY | Facility: CLINIC | Age: 45
End: 2025-06-03
Payer: COMMERCIAL

## 2025-06-03 DIAGNOSIS — H90.0 CONDUCTIVE HEARING LOSS OF BOTH EARS: ICD-10-CM

## 2025-06-03 DIAGNOSIS — H71.90 CHOLESTEATOMA, UNSPECIFIED LATERALITY: ICD-10-CM

## 2025-06-03 DIAGNOSIS — H69.93 EUSTACHIAN TUBE DYSFUNCTION, BILATERAL: ICD-10-CM

## 2025-06-03 PROCEDURE — 99999 PR PBB SHADOW E&M-EST. PATIENT-LVL II: CPT | Mod: PBBFAC,,, | Performed by: OTOLARYNGOLOGY

## 2025-06-03 PROCEDURE — 99213 OFFICE O/P EST LOW 20 MIN: CPT | Mod: S$GLB,,, | Performed by: OTOLARYNGOLOGY

## 2025-06-03 PROCEDURE — 1159F MED LIST DOCD IN RCRD: CPT | Mod: CPTII,S$GLB,, | Performed by: OTOLARYNGOLOGY

## 2025-06-03 PROCEDURE — 3044F HG A1C LEVEL LT 7.0%: CPT | Mod: CPTII,S$GLB,, | Performed by: OTOLARYNGOLOGY

## 2025-06-13 NOTE — PROGRESS NOTES
OTOLARYNGOLOGY- FACIAL PLASTIC & RECONSTRUCTIVE SURGERY      Junior Silva  [id number]    CC:No chief complaint on file.      HISTORY OF PRESENT ILLNESS: Junior Silva  is a 45 y.o. male who was referred to me by Dr. Prema Ross in consultation for nasal obstruction.  Junior SURESH reports a many year history of difficulty breathing through the nose that is constant, and states it is worse on the left side, but bilateral in nature. This nasal breathing difficulty causes him to be primarily a mouth breather and makes exercise difficult for him. He has frequent sinus infections that take a long time to go away unless he uses antibiotics. In March 2025 he did 3 rounds of antibiotics before     There is not a history of nasal trauma.  There is not a history of previous nasal surgery.      There is a long history of nasal allergies/chronic sinus disease. He has had allergy testing 10+ years ago. He is allergic to mold, cats, and dogs. He does not have a history of asthma, he does have significant family hx of asthma. He denies a diagnosis of obstructive sleep apnea.    Current sinonasal medications include Azelastine, Zyrtec, + intranasal steroid: fluticasone (Flonase), and saline irrigations.  He reports great improvement with these.      He has not used Breathe-Right strips/nasal cones.     Occupation/Family:  [place] [employer] and [employer]    NOSE: 4-x-4-0-3      Past Medical History  He has a past medical history of Allergy, Gastroesophageal reflux disease without esophagitis, Hyperlipidemia, Mild vitamin D deficiency, Rectal bleeding, and Severe obesity (BMI 35.0-35.9 with comorbidity).    Past Surgical History  He has a past surgical history that includes Colonoscopy (N/A, 11/12/2015).    Family History  His family history includes Cancer in his father; Cancer (age of onset: 84) in his paternal uncle; Hyperlipidemia in his mother; Hypertension in his mother; No Known Problems in his  daughter.    Social History  He reports that he has never smoked. He has never used smokeless tobacco. He reports current alcohol use. He reports that he does not use drugs.    Allergies  He is allergic to paba plus sunscreen and benadryl itch relief.    Medications  He has a current medication list which includes the following prescription(s): azelastine, cetirizine, and fluticasone propionate.      Review of Systems     Constitutional: Negative for appetite change, chills, fatigue, fever and unexpected weight loss.      Eyes:  Negative for change in eyesight, eye drainage, eye itching and photophobia.     Respiratory:  Negative for cough, shortness of breath, sleep apnea, snoring and wheezing.      Cardiovascular:  Negative for chest pain, foot swelling, irregular heartbeat and swollen veins.     Gastrointestinal:  Negative for abdominal pain, acid reflux, constipation, diarrhea, heartburn and vomiting.     Genitourinary: Negative for difficulty urinating, sexual problems and frequent urination.     Musc: Negative for aching joints, aching muscles, back pain and neck pain.     Skin: Negative for rash.     Allergy: Positive for seasonal allergies.     Endocrine: Negative for cold intolerance and heat intolerance.      Neurological: Negative for dizziness, headaches, light-headedness, seizures and tremors.      Hematologic: Negative for bruises/bleeds easily and swollen glands.      Psychiatric: Negative for decreased concentration, depression, nervous/anxious and sleep disturbance.              PHYSICAL EXAM:  There were no vitals taken for this visit.    General: Alert and oriented in no acute distress  Head and Face: Normocephaic, atruamatic  Neurological: Cranial nerves are grossly intact  Skin: Skin texture/appearance is appropriate for age  Eyes:   EOMI, sclera clear  Ears: Pinna are normal in shape and position  EACs healthy appearing bilaterally  TMs clear without VIVIANA or perforation bilaterally  Oral cavity  and Oropharynx: Mucous membranes moist without lesions present.  Tongue protrudes midline and palate elevates midline.  Neck: Supple without LAD.    Lungs:breathing comfortably  Psychiatric:  Mood and affect are normal    Nasal Analysis:    Bony and soft tissue support: Class I occlusion with adequate projection of the maxilla; the muscular tone in the nose and perinasal musculature appears grossly normal  Skin assessment: without visible or palpable scars; Murry II with medium thickness skin  Frontal:  The dorsum is lower 2/3 deviated to the right and the nose is proportional compared to the facial features.  Nasal base width is proportional compared to intercanthal distance. Alar retraction is not present.  Profile:  The dorsum is straight.  Tip projection is normal and rotation is decreased.   Base:   The tip is proportional in shape. There is a mild amount of nostril asymmetry.   Tip:   Tip support is appropriate.  Septum:  Anterior rhinoscopy reveals the septum is without perforation or synechiae. The septum is deviated to the left and off the spine  Inferior Turbinates: moderately hypertrophic. There are not pathological secretions present.   Nasal Valves:  The external nasal valve is narrow bilaterally  The internal nasal valve is narrow and collapsed bilaterally  Modified Gus maneuver does improve breathing in the batten >  position bilaterally          ASSESSMENT:   No diagnosis found.        PLAN:     I had a long discussion with the patient regarding their condition and the further workup and management options.     Junior Cool nasal obstruction is related to nasal septal deviation, enlarged inferior turbinates and collapsed nasal valve, and significantly impacts the patient's quality of life.  Previous medical treatments including topical steroid nasal spray have failed to provide benefit.  There are anatomic abnormalities contributing to the nasal obstruction that require surgical  intervention for adequate improvement.    We discussed the operative plan including extracorporeal septoplasty with anterior septal reconstruction with removal of severe caudal septal spur on the left and reconstruction of his mid vault with  grafts.  Additionally given his deep supra alar creases and dynamic external valve collapse, I will plan for integrated alar strut graft verse Batten grafting.  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  I have prepared Junior SURESH for the possibility of requiring auricular cartilage or rib cartilage graft if needed.    The risks, benefits, and alternatives of the procedure were discussed in detail including but not limited to bleeding, infection, pain, scar, septal perforation, synechiae, failure to improve, asymmetry or irregularity of the nose, need for repeat procedures, saddle deformity.    We also discussed the expected post operative course and time frame for final healing.  Junior SURESH expressed understanding of the procedure, had all questions answered, and is interested in proceeding with surgery        Voice recognition software was used in the creation of this note/communication and any sound-alike errors which may have occurred from its use should be taken in context when interpreting. If such errors prevent a clear understanding of the note/communication, please contact the office for clarification.     Omar Schneider MD  Facial Plastic and Reconstructive Surgeon  [facility] Otol[facility]ology - Head & Neck Surgery

## 2025-06-16 ENCOUNTER — OFFICE VISIT (OUTPATIENT)
Dept: OTOLARYNGOLOGY | Facility: CLINIC | Age: 45
End: 2025-06-16
Payer: COMMERCIAL

## 2025-06-16 VITALS
HEART RATE: 59 BPM | DIASTOLIC BLOOD PRESSURE: 71 MMHG | HEIGHT: 71 IN | WEIGHT: 271.69 LBS | SYSTOLIC BLOOD PRESSURE: 143 MMHG | BODY MASS INDEX: 38.04 KG/M2

## 2025-06-16 DIAGNOSIS — H66.93 BILATERAL OTITIS MEDIA, UNSPECIFIED OTITIS MEDIA TYPE: ICD-10-CM

## 2025-06-16 DIAGNOSIS — J34.2 DEVIATED NASAL SEPTUM: Primary | ICD-10-CM

## 2025-06-16 DIAGNOSIS — J30.89 NON-SEASONAL ALLERGIC RHINITIS, UNSPECIFIED TRIGGER: ICD-10-CM

## 2025-06-16 DIAGNOSIS — J34.89 CONCHA BULLOSA: ICD-10-CM

## 2025-06-16 DIAGNOSIS — J34.2 DEVIATED NASAL SEPTUM: ICD-10-CM

## 2025-06-16 DIAGNOSIS — J34.829 NASAL VALVE COLLAPSE: ICD-10-CM

## 2025-06-16 DIAGNOSIS — J34.89 NASAL OBSTRUCTION: ICD-10-CM

## 2025-06-16 DIAGNOSIS — J34.89 NASAL OBSTRUCTION: Primary | ICD-10-CM

## 2025-06-16 DIAGNOSIS — J34.3 HYPERTROPHY OF BOTH INFERIOR NASAL TURBINATES: ICD-10-CM

## 2025-06-16 PROCEDURE — 3008F BODY MASS INDEX DOCD: CPT | Mod: CPTII,S$GLB,, | Performed by: STUDENT IN AN ORGANIZED HEALTH CARE EDUCATION/TRAINING PROGRAM

## 2025-06-16 PROCEDURE — 1160F RVW MEDS BY RX/DR IN RCRD: CPT | Mod: CPTII,S$GLB,, | Performed by: STUDENT IN AN ORGANIZED HEALTH CARE EDUCATION/TRAINING PROGRAM

## 2025-06-16 PROCEDURE — 3044F HG A1C LEVEL LT 7.0%: CPT | Mod: CPTII,S$GLB,, | Performed by: STUDENT IN AN ORGANIZED HEALTH CARE EDUCATION/TRAINING PROGRAM

## 2025-06-16 PROCEDURE — 99214 OFFICE O/P EST MOD 30 MIN: CPT | Mod: S$GLB,,, | Performed by: STUDENT IN AN ORGANIZED HEALTH CARE EDUCATION/TRAINING PROGRAM

## 2025-06-16 PROCEDURE — 3077F SYST BP >= 140 MM HG: CPT | Mod: CPTII,S$GLB,, | Performed by: STUDENT IN AN ORGANIZED HEALTH CARE EDUCATION/TRAINING PROGRAM

## 2025-06-16 PROCEDURE — 1159F MED LIST DOCD IN RCRD: CPT | Mod: CPTII,S$GLB,, | Performed by: STUDENT IN AN ORGANIZED HEALTH CARE EDUCATION/TRAINING PROGRAM

## 2025-06-16 PROCEDURE — 3078F DIAST BP <80 MM HG: CPT | Mod: CPTII,S$GLB,, | Performed by: STUDENT IN AN ORGANIZED HEALTH CARE EDUCATION/TRAINING PROGRAM

## 2025-06-16 NOTE — LETTER
June 16, 2025        Prema Ross MD  [address]  [address]  [facility] [address]       Prema - thank you for this referral. Super nice josselyn and great candidate for open SRP with that septum. - [facility] - [facility] N[facility] Th[facility]  [address], [address]  [facility] [address]  Phone: [phone]  Fax: [phone]   Patient: Junior Silva   MR Number: [id number]   YOB: 1980   Date of Visit: 6/16/2025       Dear Dr. Ross:    Thank you for referring Junior Silva to me for evaluation. Below are the relevant portions of my assessment and plan of care.            If you have questions, please do not hesitate to call me. I look forward to following Junior SURESH along with you.    Sincerely,      Omar Schneider MD           CC  No Recipients

## 2025-06-24 ENCOUNTER — OFFICE VISIT (OUTPATIENT)
Dept: OTOLARYNGOLOGY | Facility: CLINIC | Age: 45
End: 2025-06-24
Payer: COMMERCIAL

## 2025-06-24 ENCOUNTER — TELEPHONE (OUTPATIENT)
Dept: OTOLARYNGOLOGY | Facility: CLINIC | Age: 45
End: 2025-06-24
Payer: COMMERCIAL

## 2025-06-24 VITALS
HEART RATE: 69 BPM | BODY MASS INDEX: 37.94 KG/M2 | WEIGHT: 272.06 LBS | DIASTOLIC BLOOD PRESSURE: 67 MMHG | SYSTOLIC BLOOD PRESSURE: 118 MMHG

## 2025-06-24 DIAGNOSIS — J34.2 DEVIATED NASAL SEPTUM: Chronic | ICD-10-CM

## 2025-06-24 DIAGNOSIS — H91.90 HEARING LOSS, UNSPECIFIED HEARING LOSS TYPE, UNSPECIFIED LATERALITY: ICD-10-CM

## 2025-06-24 DIAGNOSIS — H69.93 DYSFUNCTION OF BOTH EUSTACHIAN TUBES: Chronic | ICD-10-CM

## 2025-06-24 DIAGNOSIS — J30.89 NON-SEASONAL ALLERGIC RHINITIS, UNSPECIFIED TRIGGER: Primary | Chronic | ICD-10-CM

## 2025-06-24 DIAGNOSIS — H93.13 TINNITUS OF BOTH EARS: Chronic | ICD-10-CM

## 2025-06-24 DIAGNOSIS — J34.89 NASAL OBSTRUCTION: Chronic | ICD-10-CM

## 2025-06-24 DIAGNOSIS — J34.829 NASAL VALVE COLLAPSE: ICD-10-CM

## 2025-06-24 PROCEDURE — 1159F MED LIST DOCD IN RCRD: CPT | Mod: CPTII,S$GLB,, | Performed by: OTOLARYNGOLOGY

## 2025-06-24 PROCEDURE — 3008F BODY MASS INDEX DOCD: CPT | Mod: CPTII,S$GLB,, | Performed by: OTOLARYNGOLOGY

## 2025-06-24 PROCEDURE — 3074F SYST BP LT 130 MM HG: CPT | Mod: CPTII,S$GLB,, | Performed by: OTOLARYNGOLOGY

## 2025-06-24 PROCEDURE — 1160F RVW MEDS BY RX/DR IN RCRD: CPT | Mod: CPTII,S$GLB,, | Performed by: OTOLARYNGOLOGY

## 2025-06-24 PROCEDURE — 3078F DIAST BP <80 MM HG: CPT | Mod: CPTII,S$GLB,, | Performed by: OTOLARYNGOLOGY

## 2025-06-24 PROCEDURE — 3044F HG A1C LEVEL LT 7.0%: CPT | Mod: CPTII,S$GLB,, | Performed by: OTOLARYNGOLOGY

## 2025-06-24 PROCEDURE — 99999 PR PBB SHADOW E&M-EST. PATIENT-LVL III: CPT | Mod: PBBFAC,,, | Performed by: OTOLARYNGOLOGY

## 2025-06-24 PROCEDURE — 99214 OFFICE O/P EST MOD 30 MIN: CPT | Mod: S$GLB,,, | Performed by: OTOLARYNGOLOGY

## 2025-06-24 NOTE — PATIENT INSTRUCTIONS
Continue nasal sprays daily.    Continue saline nasal rinse daily.     Continue to follow up with Dr Villalta to check tube and middle ear status.     Patient monitor symptoms of his nasal issues, and if they're worsening, then he will inform me to decide about whether antibiotics are needed before his nasal procedure.      How do you use a Nasal Corticosteroid Spray?    Make sure you understand your dosing instructions. Spray only the number of prescribed sprays in each nostril. Read the package instructions before using your spray the first time.    Most corticosteroid sprays suggest the following steps:    Wash your hands well.    Gently blow your nose to clear the passageway.    Shake the container several times.    Tilt your head slightly downward.  Use the opposite hand from the nostril you will be spraying to hold the spray bottle.    Block one nostril with your finger.  Insert the nasal applicator into the other nostril.    Aim the spray toward the outer wall of the nostril.  Inhale slowly through the nose and press the .    Breathe out and repeat to apply the prescribed number of sprays.  Repeat these steps for the other nostril.     Avoid sneezing or blowing your nose right after spraying.

## 2025-06-24 NOTE — PROGRESS NOTES
"    Chief Complaint   Patient presents with    Follow-up     Constant Sinus congestions     Per Nidhi Mejia NP:  HPI 1/31/2025: Junior Silva is a 45 y.o. male who is referred to me by Dr. Ragini Constantino in consultation for possible otitis media . Junior Silva reported many episodes of otitis media in the past that required PE tubes. He felt like he has been having bilateral ear infection since last year due to muffled hearing. He went his PCP on 1/2/2025 and was rx'ed Augmentin bid x 10 days for sinusitis. He reports having sneezing and dizzy spells while he was on Augmentin. Reports spinning sensation for well over 20 minutes after he sneezes. His dizziness has resolved after he completed the Augmentin.   He perceived tinnitus in both ears. 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   He also reports of seasonal allergies with headaches and nasal congestion. He states that he had the allergy test over 10 years ago and was shown to have sensitivity to dust mites. He is currently not on any nasal sprays or antihistamines.     Interval HPI 3/17/2025:  Follow up visit. He reports sinus and ear symptoms improved. He is no longer having dizziness. He states that his sinus infection improved after taking Augmentin and Medrol Dosepak. He has been using the nasal saline rinses daily along with taking cetirizine and using Flonase. CT temporal showed possible cholesteatoma of the right the epitympanum. CT also showed chronic sinusitis.       Per Dr. Ross:  Interval HPI 04/22/2025 :      Longstanding history of nasal congestion, especially left side.  He does not breathe well through the nose and constantly mouth breathes.  Exercises difficult for this reason.    He has a longstanding history of allergic rhinitis, has had allergy testing in the distant past.  He has significant family history of allergic rhinitis and asthma.  There is no known trauma to the nose.  Sibling had congenital " septal issues as well.  Patient had 2 recent rounds of Augmentin but those were subsequent to CT scan.  Related to ear issues.   He does feel the Flonase, azelastine, and Zyrtec he has been on for the last 2 months are helping his symptoms.  Patient saw Dr. Villalta recently for the left-sided middle ear mass.  Question of cholesteatoma versus encephalocele.  PE tube placed in the left ear by Dr. Villalta and at this time they are continuing to monitor and there has not been discussion of surgical intervention.  Allergens; +old house/?mold, +cats dogs, no carpeting, no smoking exp    CT TEMPORAL BONE WITHOUT CONTRAST- reviewed today with the patient- agree with below, also noted significant septal deviation to left and evidence of sinusitis of maxillary, sphenoid, ethmoid sinuses on left.      CLINICAL HISTORY:  Hearing loss.     COMPARISON: None.     TECHNIQUE: Standard noncontrast CT scan of the temporal bone with thin section axial and oblique sagittal and coronal reformats. All CT scans at [this location] are performed using dose modulation techniques as appropriate to a performed exam including the following: automated exposure control; adjustment of the mA and/or kV according to patient size (this includes techniques or standardized protocols for targeted exams where dose is matched to indication / reason for exam; i.e. extremities or head); use of iterative reconstruction technique.     FINDINGS: The left external auditory canal is patent. There is thickening of the left tympanic membrane with retraction. There are linear bands of soft tissue thickening abutting the medial surface of the tympanic membrane extending into the left mesotympanum and epitympanum.     The left ear ossicles appear normal.     The left mastoid is hyperpneumatized. Normal aeration is noted. The left inner ear structures are normal. Minor pneumatization of the left petrous apex air cells is seen.     The right external auditory canal is  patent. There is also thickening of the right tympanic membrane with retraction. There is abnormal soft tissue thickening in the epitympanum surrounding the right middle ear ear ossicles. No definite bone destruction is seen. The scutum is relatively well-defined. Nonetheless findings are concerning for an epitympanic cholesteatoma the right middle ear.     The right inner ear structures appear normal. The right mastoid is normally pneumatized with soft tissue opacification of the aditus and partially of the antrum of the temporal bone. The IAC appears grossly normal bilaterally.     Incidentally there is mucosal thickening in the sphenoid sinus cavities.        Impression:   Left tympanic membrane thickening with retraction and linear opacities in the medial ear suggestive of chronic sequela of otitis media.     Right tympanic membrane thickening with retraction as well as soft tissue opacification of the epitympanum concerning for cholesteatoma.    Interval HPI 06/24/2025 :    History of Present Illness    CHIEF COMPLAINT:  - Patient presents for a follow-up visit to discuss recent appointments with specialists and ongoing ear and sinus problems.    HPI:  Patient has significant sinus problems. He was recently evaluated by Dr. Villalta and Dr. Schneider. Dr. Villalta inserted a tube in his ear, resulting in improved hearing. He reports tinnitus, which he has had for most of his life but only recently learned the medical term for.   He has not had any ongoing otorrhea from the left ear.    He had a follow-up visit with Dr. Villalta after the tube insertion, which he felt was unnecessary as there were no issues present at that time. Dr. Villalta plans to recheck his ear in a few months to assess for any reaccumulation of fluid or concerns about a CSF leak.    Regarding the sinus problems, Dr. Schneider performed a nasal exam and documented findings consistent with what the current provider had anticipated. Dr. Schneider has  suggested functional septorhinoplasty.  He is scheduled for surgery in August.    He also has some hearing loss in the high frequencies, which may contribute to the persistent tinnitus, even if other issues improve.    He denies any other issues besides improved hearing.     Patient notes some increase of his allergy/nasal congestion symptoms over the last couple of weeks.  He has had clear/whitish mucus drainage bilaterally, worse on the left.  He has not had any facial tenderness, tooth tenderness, lymphadenopathy.  He does not report any purulent nasal drainage or cough.  He has been continuing to use his azelastine, Flonase, Zyrtec.           Past Medical History:   Diagnosis Date    Allergy     Gastroesophageal reflux disease without esophagitis 10/06/2015    Hyperlipidemia     Mild vitamin D deficiency 11/13/2012    Rectal bleeding     Severe obesity (BMI 35.0-35.9 with comorbidity) 01/14/2019     Social History     Socioeconomic History    Marital status:     Number of children: 1   Tobacco Use    Smoking status: Never    Smokeless tobacco: Never   Substance and Sexual Activity    Alcohol use: Yes    Drug use: No    Sexual activity: Yes     Partners: Female     Social Drivers of Health     Financial Resource Strain: Low Risk  (6/17/2025)    Overall Financial Resource Strain (CARDIA)     Difficulty of Paying Living Expenses: Not very hard   Food Insecurity: No Food Insecurity (6/17/2025)    Hunger Vital Sign     Worried About Running Out of Food in the Last Year: Never true     Ran Out of Food in the Last Year: Never true   Transportation Needs: No Transportation Needs (6/17/2025)    PRAPARE - Transportation     Lack of Transportation (Medical): No     Lack of Transportation (Non-Medical): No   Physical Activity: Inactive (6/17/2025)    Exercise Vital Sign     Days of Exercise per Week: 0 days     Minutes of Exercise per Session: 0 min   Stress: No Stress Concern Present (6/17/2025)    [employer] - Occupational Stress Questionnaire     Feeling of Stress : Not at all   Housing Stability: Low Risk  (6/17/2025)    Housing Stability Vital Sign     Unable to Pay for Housing in the Last Year: No     Number of Times Moved in the Last Year: 0     Homeless in the Last Year: No     Past Surgical History:   Procedure Laterality Date    COLONOSCOPY N/A 11/12/2015    Procedure: COLONOSCOPY;  Surgeon: Genaro Wright MD;  Location: [address]);  Service: Endoscopy;  Laterality: N/A;     Family History   Problem Relation Name Age of Onset    Hyperlipidemia Mother      Hypertension Mother      Cancer Father          pancreatic    No Known Problems Daughter Betty     Cancer Paternal Uncle  84        Colon           Review of Systems  General: negative for chills, fever or weight loss  Psychological: negative for mood changes or depression  Ophthalmic: negative for blurry vision, photophobia or eye pain  ENT: see HPI  Respiratory: no cough, shortness of breath, or wheezing  Cardiovascular: no chest pain or dyspnea on exertion  Gastrointestinal: no abdominal pain, change in bowel habits, or black/ bloody stools  Musculoskeletal: negative for gait disturbance or muscular weakness  Neurological: no syncope or seizures; no ataxia  Dermatological: negative for pruritis,  rash and jaundice  Hematologic/lymphatic: no easy bruising, no new adenopathy      Physical Exam:    Vitals:    06/24/25 1505   BP: 118/67   Pulse: 69           Constitutional: Well appearing / communicating without difficutly.  NAD.  Overweight.  Eyes: EOM I Bilaterally  Head/Face: Normocephalic.  Negative paranasal sinus pressure/tenderness.  Salivary glands WNL.  House Brackmann I Bilaterally.    Right Ear: Auricle normal appearance. External Auditory Canal within normal limits; no lesions or masses,TM w/o masses/lesions/perforations. TM is scarred and retracted, +auto-insufflation   Left Ear: Auricle normal appearance. External  Auditory Canal within normal limits; no lesions or masses,TM w/o masses/lesions/perforations. TM is scarred PE tube in place and patent.  No otorrhea or effusion.   Nose:  Significant septal deviation to left.  Nasal dorsum deviated to right, significant narrowing of naris on left due to septal and dorsal deviation.  Nasal valve collapse noted left.  Inferior Turbinates 3+ bilaterally. No septal perforation. No masses/lesions. External nasal skin appears normal without masses/lesions.  No purulent drainage noted from nasal cavities bilaterally, clear/white drainage noted left.  Oral Cavity: Gingiva/lips within normal limits.  Dentition/gingiva healthy appearing. Mucus membranes moist. Floor of mouth soft, no masses palpated. Oral Tongue mobile. Hard Palate appears normal.    Oropharynx: Base of tongue appears normal. No masses/lesions noted. Tonsillar fossa/pharyngeal wall without lesions. Posterior oropharynx WNL.  Soft palate without masses. Midline uvula.   Neck/Lymphatic: No LAD I-VI bilaterally.  No thyromegaly.  No masses noted on exam.    Mirror laryngoscopy/nasopharyngoscopy: Active gag reflex.  Unable to perform.    Neuro/Psychiatric: AOx3.  Normal mood and affect.   Cardiovascular: Normal carotid pulses bilaterally, no increasing jugular venous distention noted at cervical region bilaterally.    Respiratory: Normal respiratory effort, no stridor, no retractions noted.          Previous audiogram:         Assessment:    ICD-10-CM ICD-9-CM    1. Non-seasonal allergic rhinitis, unspecified trigger  J30.89 477.8       2. Tinnitus of both ears  H93.13 388.30       3. Deviated nasal septum  J34.2 470       4. Nasal valve collapse  J34.829 738.0       5. Nasal obstruction  J34.89 478.19       6. Dysfunction of both eustachian tubes  H69.93 381.81       7. Hearing loss, unspecified hearing loss type, unspecified laterality  H91.90 389.9             The primary encounter diagnosis was Non-seasonal allergic  rhinitis, unspecified trigger. Diagnoses of Tinnitus of both ears, Deviated nasal septum, Nasal valve collapse, Nasal obstruction, Dysfunction of both eustachian tubes, and Hearing loss, unspecified hearing loss type, unspecified laterality were also pertinent to this visit.      Plan:  Assessment & Plan    Allergic rhinitis/deviated septum/nasal valve collapse:  - Assessed sinus problems and nasal exam findings from Dr. Schneider.  - Current treatment plan with nasal sprays and rinse is appropriate for managing sinus symptoms.  - Patient to continue using nasal sprays and nasal rinse diligently.  - if current symptoms worsen over the next couple of weeks, patient will inform me and we will decide on treatment for sinusitis if this is indicated.    EUSTACHIAN TUBE DISORDER:  - Evaluated hearing improvement after tube placement by Dr. Villalta.  - Considered long-term management of middle ear issues, including potential CSF leak.    SENSORINEURAL HEARING LOSS AND TINNITUS:  - Noted persistent high-frequency hearing loss and tinnitus, likely to continue despite other improvements.    FOLLOW-UP:  - Reviewed visits with Dr. Villalta and Dr. Schneider since last encounter.  - Contact the office if any changes occur in the next few weeks or if any other issues arise before scheduled follow-ups.        Prema Ross MD      This note was generated with the assistance of ambient listening technology. Verbal consent was obtained by the patient and accompanying visitor(s) for the recording of patient appointment to facilitate this note. I attest to having reviewed and edited the generated note for accuracy, though some syntax or spelling errors may persist. Please contact the author of this note for any clarification.

## 2025-07-22 ENCOUNTER — TELEPHONE (OUTPATIENT)
Dept: OTOLARYNGOLOGY | Facility: CLINIC | Age: 45
End: 2025-07-22
Payer: COMMERCIAL